# Patient Record
Sex: FEMALE | Race: BLACK OR AFRICAN AMERICAN | NOT HISPANIC OR LATINO
[De-identification: names, ages, dates, MRNs, and addresses within clinical notes are randomized per-mention and may not be internally consistent; named-entity substitution may affect disease eponyms.]

---

## 2017-02-15 ENCOUNTER — APPOINTMENT (OUTPATIENT)
Dept: INTERNAL MEDICINE | Facility: CLINIC | Age: 47
End: 2017-02-15

## 2017-02-16 ENCOUNTER — APPOINTMENT (OUTPATIENT)
Dept: INTERNAL MEDICINE | Facility: CLINIC | Age: 47
End: 2017-02-16

## 2017-06-27 ENCOUNTER — EMERGENCY (EMERGENCY)
Facility: HOSPITAL | Age: 47
LOS: 0 days | Discharge: HOME | End: 2017-06-27
Attending: EMERGENCY MEDICINE
Payer: COMMERCIAL

## 2017-06-27 VITALS
DIASTOLIC BLOOD PRESSURE: 71 MMHG | SYSTOLIC BLOOD PRESSURE: 118 MMHG | WEIGHT: 169.98 LBS | OXYGEN SATURATION: 99 % | HEART RATE: 82 BPM | TEMPERATURE: 99 F | HEIGHT: 70 IN | RESPIRATION RATE: 18 BRPM

## 2017-06-27 VITALS
DIASTOLIC BLOOD PRESSURE: 77 MMHG | SYSTOLIC BLOOD PRESSURE: 122 MMHG | HEART RATE: 68 BPM | OXYGEN SATURATION: 98 % | TEMPERATURE: 98 F

## 2017-06-27 LAB
ALBUMIN SERPL ELPH-MCNC: 4 G/DL — SIGNIFICANT CHANGE UP (ref 3.3–5)
ALP SERPL-CCNC: 103 U/L — SIGNIFICANT CHANGE UP (ref 40–120)
ALT FLD-CCNC: 30 U/L — SIGNIFICANT CHANGE UP (ref 12–78)
ANION GAP SERPL CALC-SCNC: 5 MMOL/L — SIGNIFICANT CHANGE UP (ref 5–17)
APTT BLD: 35.4 SEC — SIGNIFICANT CHANGE UP (ref 27.5–37.4)
AST SERPL-CCNC: 19 U/L — SIGNIFICANT CHANGE UP (ref 15–37)
BASOPHILS # BLD AUTO: 0 K/UL — SIGNIFICANT CHANGE UP (ref 0–0.2)
BASOPHILS NFR BLD AUTO: 1.1 % — SIGNIFICANT CHANGE UP (ref 0–2)
BILIRUB SERPL-MCNC: 0.6 MG/DL — SIGNIFICANT CHANGE UP (ref 0.2–1.2)
BUN SERPL-MCNC: 21 MG/DL — SIGNIFICANT CHANGE UP (ref 7–23)
CALCIUM SERPL-MCNC: 8.8 MG/DL — SIGNIFICANT CHANGE UP (ref 8.5–10.1)
CHLORIDE SERPL-SCNC: 108 MMOL/L — SIGNIFICANT CHANGE UP (ref 96–108)
CO2 SERPL-SCNC: 30 MMOL/L — SIGNIFICANT CHANGE UP (ref 22–31)
CREAT SERPL-MCNC: 0.81 MG/DL — SIGNIFICANT CHANGE UP (ref 0.5–1.3)
EOSINOPHIL # BLD AUTO: 0.1 K/UL — SIGNIFICANT CHANGE UP (ref 0–0.5)
EOSINOPHIL NFR BLD AUTO: 2.9 % — SIGNIFICANT CHANGE UP (ref 0–6)
GLUCOSE SERPL-MCNC: 143 MG/DL — HIGH (ref 70–99)
HCG SERPL-ACNC: 6 MIU/ML — SIGNIFICANT CHANGE UP
HCT VFR BLD CALC: 36.9 % — SIGNIFICANT CHANGE UP (ref 34.5–45)
HGB BLD-MCNC: 13.2 G/DL — SIGNIFICANT CHANGE UP (ref 11.5–15.5)
INR BLD: 1.1 RATIO — SIGNIFICANT CHANGE UP (ref 0.88–1.16)
LYMPHOCYTES # BLD AUTO: 1.8 K/UL — SIGNIFICANT CHANGE UP (ref 1–3.3)
LYMPHOCYTES # BLD AUTO: 41.8 % — SIGNIFICANT CHANGE UP (ref 13–44)
MCHC RBC-ENTMCNC: 30.4 PG — SIGNIFICANT CHANGE UP (ref 27–34)
MCHC RBC-ENTMCNC: 35.8 GM/DL — SIGNIFICANT CHANGE UP (ref 32–36)
MCV RBC AUTO: 85 FL — SIGNIFICANT CHANGE UP (ref 80–100)
MONOCYTES # BLD AUTO: 0.2 K/UL — SIGNIFICANT CHANGE UP (ref 0–0.9)
MONOCYTES NFR BLD AUTO: 5.1 % — SIGNIFICANT CHANGE UP (ref 2–14)
NEUTROPHILS # BLD AUTO: 2.1 K/UL — SIGNIFICANT CHANGE UP (ref 1.8–7.4)
NEUTROPHILS NFR BLD AUTO: 49.1 % — SIGNIFICANT CHANGE UP (ref 43–77)
PLATELET # BLD AUTO: 205 K/UL — SIGNIFICANT CHANGE UP (ref 150–400)
POTASSIUM SERPL-MCNC: 4.2 MMOL/L — SIGNIFICANT CHANGE UP (ref 3.5–5.3)
POTASSIUM SERPL-SCNC: 4.2 MMOL/L — SIGNIFICANT CHANGE UP (ref 3.5–5.3)
PROT SERPL-MCNC: 7.4 GM/DL — SIGNIFICANT CHANGE UP (ref 6–8.3)
PROTHROM AB SERPL-ACNC: 12 SEC — SIGNIFICANT CHANGE UP (ref 9.8–12.7)
RBC # BLD: 4.34 M/UL — SIGNIFICANT CHANGE UP (ref 3.8–5.2)
RBC # FLD: 11.9 % — SIGNIFICANT CHANGE UP (ref 11–15)
SODIUM SERPL-SCNC: 143 MMOL/L — SIGNIFICANT CHANGE UP (ref 135–145)
TROPONIN I SERPL-MCNC: <.015 NG/ML — SIGNIFICANT CHANGE UP (ref 0.01–0.04)
WBC # BLD: 4.3 K/UL — SIGNIFICANT CHANGE UP (ref 3.8–10.5)
WBC # FLD AUTO: 4.3 K/UL — SIGNIFICANT CHANGE UP (ref 3.8–10.5)

## 2017-06-27 PROCEDURE — 70450 CT HEAD/BRAIN W/O DYE: CPT | Mod: 26

## 2017-06-27 PROCEDURE — 99285 EMERGENCY DEPT VISIT HI MDM: CPT

## 2017-06-27 RX ORDER — SODIUM CHLORIDE 9 MG/ML
1000 INJECTION INTRAMUSCULAR; INTRAVENOUS; SUBCUTANEOUS ONCE
Qty: 0 | Refills: 0 | Status: COMPLETED | OUTPATIENT
Start: 2017-06-27 | End: 2017-06-27

## 2017-06-27 RX ORDER — IBUPROFEN 200 MG
600 TABLET ORAL ONCE
Qty: 0 | Refills: 0 | Status: COMPLETED | OUTPATIENT
Start: 2017-06-27 | End: 2017-06-27

## 2017-06-27 RX ADMIN — Medication 600 MILLIGRAM(S): at 21:26

## 2017-06-27 RX ADMIN — SODIUM CHLORIDE 1000 MILLILITER(S): 9 INJECTION INTRAMUSCULAR; INTRAVENOUS; SUBCUTANEOUS at 21:26

## 2017-06-27 NOTE — ED ADULT NURSE NOTE - OBJECTIVE STATEMENT
Patient co right side tingling from 0900 hours this morning and intermitten head discomfort starting at 1700 hours this evening; points to the occipital region. States, "I feel lightheaded when I walk and when I wake up in the morning both my arms feel numb, more tingling on the right" Denies N/V, difficulty btreathing, chest pain, dizziness, SOB, blurred vision, double vision

## 2017-06-27 NOTE — ED PROVIDER NOTE - MEDICAL DECISION MAKING DETAILS
Patient with intermittent numbness of extremities and mild headache.  VSS.  Labs, EKG, CT head reviewed.  NIHSS 0.  Symptoms improved in ER with hydration and NSAIDs.  Differentials discussed were neuropathy, MS, possible TIA (low suspicion), nerve impingement, dehydration.  Patient is well appearing and outpatient MRI was discussed.  Neurology referral given.  Discussed results and outcome of today's visit with the patient.  Patient advised to please follow up with their primary care doctor within the next 24 hours and return to the Emergency Department for worsening symptoms or any other concerns.  Patient advised that their doctor may call  to follow up on the specific results of the tests performed today in the emergency department.   Patient appears well on discharge.

## 2017-06-27 NOTE — ED ADULT TRIAGE NOTE - CHIEF COMPLAINT QUOTE
"my head feels like pounding" Reports having headache and left sided tingling sensation starting this morning, worsening throughout day, with right hand as well. Denies nausea, vomiting, fever, chills, coughing, chest pain, shortness of breath, trouble walking, speaking.

## 2017-06-27 NOTE — ED PROVIDER NOTE - OBJECTIVE STATEMENT
Pertinent PMH/PSH/FHx/SHx and Review of Systems contained within:  Patient is a well appearing female denying any pertinent pmh who presents to the ED for headache and numbness.  Describes intermittent numbness and tingling of right arm for several months now, came to ER today because was noting numbness and tingling in the left arm and left leg.  Also noted gradual onset of dull left posterior headache 3/10, denies any neck pain or nuchal rigidity.  +Family history of DM, denies history of CVA, MS, or other neurologic disease.  Denies any falls, injuries, or limb pain.  Patient denies EtOH/tobacco/illicit substance use.    No fever/chills, No photophobia/eye pain/changes in vision, No ear pain/sore throat/dysphagia, No chest pain/palpitations, no SOB/cough/wheeze/stridor, No abdominal pain, No N/V/D, no dysuria/frequency/discharge, No neck/back pain, no rash.

## 2017-06-28 DIAGNOSIS — R51 HEADACHE: ICD-10-CM

## 2017-06-28 DIAGNOSIS — R20.0 ANESTHESIA OF SKIN: ICD-10-CM

## 2017-10-23 ENCOUNTER — APPOINTMENT (OUTPATIENT)
Dept: OBGYN | Facility: CLINIC | Age: 47
End: 2017-10-23
Payer: COMMERCIAL

## 2017-10-23 VITALS
HEART RATE: 72 BPM | WEIGHT: 174 LBS | HEIGHT: 66 IN | BODY MASS INDEX: 27.97 KG/M2 | DIASTOLIC BLOOD PRESSURE: 79 MMHG | SYSTOLIC BLOOD PRESSURE: 130 MMHG

## 2017-10-23 PROCEDURE — 99396 PREV VISIT EST AGE 40-64: CPT

## 2017-10-23 PROCEDURE — 99213 OFFICE O/P EST LOW 20 MIN: CPT | Mod: 25

## 2019-11-25 ENCOUNTER — APPOINTMENT (OUTPATIENT)
Dept: OBGYN | Facility: CLINIC | Age: 49
End: 2019-11-25

## 2019-12-18 ENCOUNTER — APPOINTMENT (OUTPATIENT)
Dept: OBGYN | Facility: CLINIC | Age: 49
End: 2019-12-18
Payer: COMMERCIAL

## 2019-12-18 VITALS
HEART RATE: 78 BPM | DIASTOLIC BLOOD PRESSURE: 81 MMHG | HEIGHT: 66 IN | BODY MASS INDEX: 25.23 KG/M2 | SYSTOLIC BLOOD PRESSURE: 118 MMHG | WEIGHT: 157 LBS

## 2019-12-18 DIAGNOSIS — N95.2 POSTMENOPAUSAL ATROPHIC VAGINITIS: ICD-10-CM

## 2019-12-18 DIAGNOSIS — Z01.419 ENCOUNTER FOR GYNECOLOGICAL EXAMINATION (GENERAL) (ROUTINE) W/OUT ABNORMAL FINDINGS: ICD-10-CM

## 2019-12-18 DIAGNOSIS — E28.319 ASYMPTOMATIC PREMATURE MENOPAUSE: ICD-10-CM

## 2019-12-18 PROCEDURE — 99396 PREV VISIT EST AGE 40-64: CPT

## 2019-12-22 LAB — HPV HIGH+LOW RISK DNA PNL CVX: NOT DETECTED

## 2019-12-23 LAB — CYTOLOGY CVX/VAG DOC THIN PREP: ABNORMAL

## 2020-12-18 ENCOUNTER — EMERGENCY (EMERGENCY)
Facility: HOSPITAL | Age: 50
LOS: 1 days | Discharge: HOME | End: 2020-12-18
Attending: EMERGENCY MEDICINE | Admitting: EMERGENCY MEDICINE
Payer: COMMERCIAL

## 2020-12-18 VITALS
TEMPERATURE: 98 F | OXYGEN SATURATION: 100 % | RESPIRATION RATE: 16 BRPM | HEART RATE: 100 BPM | SYSTOLIC BLOOD PRESSURE: 153 MMHG | DIASTOLIC BLOOD PRESSURE: 85 MMHG

## 2020-12-18 VITALS
HEART RATE: 84 BPM | DIASTOLIC BLOOD PRESSURE: 77 MMHG | OXYGEN SATURATION: 99 % | TEMPERATURE: 99 F | SYSTOLIC BLOOD PRESSURE: 150 MMHG | RESPIRATION RATE: 18 BRPM

## 2020-12-18 PROCEDURE — 99282 EMERGENCY DEPT VISIT SF MDM: CPT

## 2020-12-18 NOTE — ED PROVIDER NOTE - CHPI ED SYMPTOMS NEG
No slurred speech, no n/v/d, no abdominal pain, no chest pain, no unsteady gait, no fecal/urinary incontinence/no fever

## 2020-12-18 NOTE — ED PROVIDER NOTE - OBJECTIVE STATEMENT
50 yr old  F presents to the ED with c/o LLE posterior pain x multiple months with intermittent episodes of tingling and weakness in bilateral upper extremities x multiple weeks. Pt also notes right TMJ pain that worsens with chewing but improves with rest. Pt states she became concerned about a possible stroke due to consolidation of symptoms. Pt attempted to contact PMD for an appointment but was told the next available was in April, was able to exchange for an earlier appointment in January. Pt presents to ED today due to concern over jaw pain. No slurred speech, no n/v/d, no fever, no abdominal pain, no chest pain, no unsteady gait, no fecal/urinary incontinence. Pt works as an  from home, sitting for prolonged periods of time. No family hx of ACS or MS. Pt overall well-appearing.

## 2020-12-18 NOTE — ED PROVIDER NOTE - CLINICAL SUMMARY MEDICAL DECISION MAKING FREE TEXT BOX
50 yr old F with a multitude of complaints due to poor f/u. No acute signs of abnormalities, good neuro exam. No concerning symptoms on physical exam. Will arrange for pt to see spine and ENT, as well as provide info for other PMDs in the area.

## 2020-12-18 NOTE — ED ADULT TRIAGE NOTE - CHIEF COMPLAINT QUOTE
Pt c/o right facial pain, LLE pain x several months, right wrist pain and tingling, LUE pain and tingling x1 month

## 2021-06-07 PROBLEM — Z78.9 OTHER SPECIFIED HEALTH STATUS: Chronic | Status: ACTIVE | Noted: 2020-12-18

## 2021-06-30 ENCOUNTER — APPOINTMENT (OUTPATIENT)
Dept: OTOLARYNGOLOGY | Facility: CLINIC | Age: 51
End: 2021-06-30

## 2021-11-01 ENCOUNTER — EMERGENCY (EMERGENCY)
Facility: HOSPITAL | Age: 51
LOS: 1 days | Discharge: HOME | End: 2021-11-01
Attending: EMERGENCY MEDICINE | Admitting: EMERGENCY MEDICINE
Payer: COMMERCIAL

## 2021-11-01 VITALS
DIASTOLIC BLOOD PRESSURE: 68 MMHG | OXYGEN SATURATION: 100 % | HEART RATE: 60 BPM | RESPIRATION RATE: 16 BRPM | SYSTOLIC BLOOD PRESSURE: 119 MMHG

## 2021-11-01 VITALS
OXYGEN SATURATION: 100 % | HEART RATE: 99 BPM | HEIGHT: 70 IN | DIASTOLIC BLOOD PRESSURE: 100 MMHG | TEMPERATURE: 98 F | RESPIRATION RATE: 18 BRPM | SYSTOLIC BLOOD PRESSURE: 159 MMHG

## 2021-11-01 LAB
ALBUMIN SERPL ELPH-MCNC: 4.3 G/DL — SIGNIFICANT CHANGE UP (ref 3.3–5)
ALP SERPL-CCNC: 92 U/L — SIGNIFICANT CHANGE UP (ref 40–120)
ALT FLD-CCNC: 20 U/L — SIGNIFICANT CHANGE UP (ref 4–33)
ANION GAP SERPL CALC-SCNC: 9 MMOL/L — SIGNIFICANT CHANGE UP (ref 7–14)
APPEARANCE UR: CLEAR — SIGNIFICANT CHANGE UP
AST SERPL-CCNC: 21 U/L — SIGNIFICANT CHANGE UP (ref 4–32)
BASOPHILS # BLD AUTO: 0 K/UL — SIGNIFICANT CHANGE UP (ref 0–0.2)
BASOPHILS NFR BLD AUTO: 0 % — SIGNIFICANT CHANGE UP (ref 0–2)
BILIRUB SERPL-MCNC: 0.5 MG/DL — SIGNIFICANT CHANGE UP (ref 0.2–1.2)
BILIRUB UR-MCNC: NEGATIVE — SIGNIFICANT CHANGE UP
BUN SERPL-MCNC: 18 MG/DL — SIGNIFICANT CHANGE UP (ref 7–23)
CALCIUM SERPL-MCNC: 10 MG/DL — SIGNIFICANT CHANGE UP (ref 8.4–10.5)
CHLORIDE SERPL-SCNC: 103 MMOL/L — SIGNIFICANT CHANGE UP (ref 98–107)
CO2 SERPL-SCNC: 28 MMOL/L — SIGNIFICANT CHANGE UP (ref 22–31)
COLOR SPEC: SIGNIFICANT CHANGE UP
CREAT SERPL-MCNC: 0.78 MG/DL — SIGNIFICANT CHANGE UP (ref 0.5–1.3)
DIFF PNL FLD: NEGATIVE — SIGNIFICANT CHANGE UP
EOSINOPHIL # BLD AUTO: 0.05 K/UL — SIGNIFICANT CHANGE UP (ref 0–0.5)
EOSINOPHIL NFR BLD AUTO: 1.8 % — SIGNIFICANT CHANGE UP (ref 0–6)
GLUCOSE SERPL-MCNC: 100 MG/DL — HIGH (ref 70–99)
GLUCOSE UR QL: NEGATIVE — SIGNIFICANT CHANGE UP
HCT VFR BLD CALC: 37.9 % — SIGNIFICANT CHANGE UP (ref 34.5–45)
HGB BLD-MCNC: 12.5 G/DL — SIGNIFICANT CHANGE UP (ref 11.5–15.5)
IANC: 1.49 K/UL — LOW (ref 1.5–8.5)
KETONES UR-MCNC: NEGATIVE — SIGNIFICANT CHANGE UP
LEUKOCYTE ESTERASE UR-ACNC: NEGATIVE — SIGNIFICANT CHANGE UP
LYMPHOCYTES # BLD AUTO: 0.94 K/UL — LOW (ref 1–3.3)
LYMPHOCYTES # BLD AUTO: 31.5 % — SIGNIFICANT CHANGE UP (ref 13–44)
MCHC RBC-ENTMCNC: 29.1 PG — SIGNIFICANT CHANGE UP (ref 27–34)
MCHC RBC-ENTMCNC: 33 GM/DL — SIGNIFICANT CHANGE UP (ref 32–36)
MCV RBC AUTO: 88.3 FL — SIGNIFICANT CHANGE UP (ref 80–100)
MONOCYTES # BLD AUTO: 0.11 K/UL — SIGNIFICANT CHANGE UP (ref 0–0.9)
MONOCYTES NFR BLD AUTO: 3.6 % — SIGNIFICANT CHANGE UP (ref 2–14)
NEUTROPHILS # BLD AUTO: 1.53 K/UL — LOW (ref 1.8–7.4)
NEUTROPHILS NFR BLD AUTO: 51.4 % — SIGNIFICANT CHANGE UP (ref 43–77)
NITRITE UR-MCNC: NEGATIVE — SIGNIFICANT CHANGE UP
PH UR: 6.5 — SIGNIFICANT CHANGE UP (ref 5–8)
PLATELET # BLD AUTO: 229 K/UL — SIGNIFICANT CHANGE UP (ref 150–400)
POTASSIUM SERPL-MCNC: 3.9 MMOL/L — SIGNIFICANT CHANGE UP (ref 3.5–5.3)
POTASSIUM SERPL-SCNC: 3.9 MMOL/L — SIGNIFICANT CHANGE UP (ref 3.5–5.3)
PROT SERPL-MCNC: 6.8 G/DL — SIGNIFICANT CHANGE UP (ref 6–8.3)
PROT UR-MCNC: NEGATIVE — SIGNIFICANT CHANGE UP
RBC # BLD: 4.29 M/UL — SIGNIFICANT CHANGE UP (ref 3.8–5.2)
RBC # FLD: 12.1 % — SIGNIFICANT CHANGE UP (ref 10.3–14.5)
SARS-COV-2 RNA SPEC QL NAA+PROBE: SIGNIFICANT CHANGE UP
SODIUM SERPL-SCNC: 140 MMOL/L — SIGNIFICANT CHANGE UP (ref 135–145)
SP GR SPEC: 1.02 — SIGNIFICANT CHANGE UP (ref 1–1.05)
TROPONIN T, HIGH SENSITIVITY RESULT: <6 NG/L — SIGNIFICANT CHANGE UP
UROBILINOGEN FLD QL: SIGNIFICANT CHANGE UP
WBC # BLD: 2.98 K/UL — LOW (ref 3.8–10.5)
WBC # FLD AUTO: 2.98 K/UL — LOW (ref 3.8–10.5)

## 2021-11-01 PROCEDURE — 99285 EMERGENCY DEPT VISIT HI MDM: CPT | Mod: 25

## 2021-11-01 PROCEDURE — 93010 ELECTROCARDIOGRAM REPORT: CPT | Mod: NC

## 2021-11-01 PROCEDURE — 71046 X-RAY EXAM CHEST 2 VIEWS: CPT | Mod: 26

## 2021-11-01 RX ORDER — ACETAMINOPHEN 500 MG
650 TABLET ORAL ONCE
Refills: 0 | Status: COMPLETED | OUTPATIENT
Start: 2021-11-01 | End: 2021-11-01

## 2021-11-01 RX ADMIN — Medication 650 MILLIGRAM(S): at 10:09

## 2021-11-01 RX ADMIN — Medication 650 MILLIGRAM(S): at 12:34

## 2021-11-01 NOTE — ED PROVIDER NOTE - NSFOLLOWUPINSTRUCTIONS_ED_ALL_ED_FT
Spouse/Patient Follow up with own doctor in 1-2 days  Return to ED for any worsening of symptoms.   Have your blood pressure recheck in one week with own doctor

## 2021-11-01 NOTE — ED PROVIDER NOTE - PROGRESS NOTE DETAILS
pt informed of results. Has no complaints. no cp or sob. no n/v/d. Recommend close pmd followup. Dc w copies of results and referral for med clinic . Pt aware needs BP rechecked in one week.   wet read cxr wnl.

## 2021-11-01 NOTE — ED ADULT NURSE NOTE - OBJECTIVE STATEMENT
Pt A/Ox4 states she has had LT sided ringing in the ear since September, states she has not gone to see her PMD, but is hoping to soon. States the pain was worse today as she felt abdominal pain associated with her body pain and ringing in her LT ear. Pt appears well otherwise, breathing even and unlabored, skin warm and dry. PIV inserted with aseptic technique, blood drawn, labeled at bedside with 2 pt identifiers and sent to lab. Pt aware of POC, NAD. Medicated per order.

## 2021-11-01 NOTE — ED PROVIDER NOTE - PATIENT PORTAL LINK FT
You can access the FollowMyHealth Patient Portal offered by [facility] by registering at the following website: [email or web address]. By joining [facility]’s FollowMyHealth portal, you will also be able to view your health information using other applications (apps) compatible with our system.

## 2021-11-01 NOTE — ED PROVIDER NOTE - NSFOLLOWUPCLINICS_GEN_ALL_ED_FT
[facility] General Internal Medicine  General Internal Medicine  [address] [place] [address]  Phone: [phone]  Fax:

## 2021-11-01 NOTE — ED PROVIDER NOTE - PHYSICAL EXAMINATION
Dr Mckeon  nontoxic ambulatory female. mmm. non icteric.   no facial asymmetry no slurred speech supple neck  normal S1-S2  No resp distress. able to speak in full and clear sentences. no wheeze, rales or stridor.  soft nontender abdomen. no  rebound. no guarding. no sign of trauma. no CVAT  AOx3, no focal deficits. CN 2-12 grossly intact. nl gait. no meningeal signs.   no pedal edema. no calf tenderness. normal pulses bilateral feet.

## 2021-11-01 NOTE — ED PROVIDER NOTE - OBJECTIVE STATEMENT
51yoF no sig pmhx pw myalgia since 9-2021. Intermittent pain of ext, head, abdomen pain and chest pain on/off since Sept. Described as a "cramping pain" no associated n/v/d no sob no cough. CP is not pleuritic or exertional. no YEE no orthopnea no leg swelling or calf pain. no travel. nl BM. no dysuria. LMP at age 42 no vag bleeding. no hormone tx. no weight loss. Denies any ETOH/drug use   hasn't seen PMD.   Vaccinated for covid. no active cp now.

## 2021-11-01 NOTE — ED ADULT TRIAGE NOTE - CHIEF COMPLAINT QUOTE
Pt states that in September she developed pain and ringing to ears, headache, and now pain to muscles all over her body, tingling to body and burning under skin. Pt states the symptoms are more pronounced on left side of her body. Pt denies PMH, vaccinated against covid.

## 2022-02-01 ENCOUNTER — APPOINTMENT (OUTPATIENT)
Dept: NEUROLOGY | Facility: CLINIC | Age: 52
End: 2022-02-01

## 2022-02-16 NOTE — ED ADULT NURSE NOTE - CHIEF COMPLAINT QUOTE
Patient called back.    He states they are in [place] and he has been trying to walk more and has been swimming laps in the pool. He states when he is more active during the day he notices he will have increased right foot pain. He states it is the \"inner in-step\" of his foot. He states the pain is a sharp pain that at times will \"zing\" up to his toes. He states it was present all last night and he didn't sleep at all. He states this happens 1-2 times per week.     He states at times he has tried taking an extra gabapentin but last night that did not help. He states he also has tried taking tramadol and that sometimes helps, then another time it doesn't seem to help at all.     He states they have a bed that he can raise his feet at night but again not all the time that will work.     Patient states he has been taking the Gabapentin 2 tablets twice daily and if they will be doing more that day he may take an extra tablet.     Patient denies swelling, skin discoloration. He states he feels pretty well other than the foot pain.       Thoughts?       "my head feels like pounding" Reports having headache and left sided tingling sensation starting this morning, worsening throughout day, with right hand as well. Denies nausea, vomiting, fever, chills, coughing, chest pain, shortness of breath, trouble walking, speaking.

## 2022-02-18 ENCOUNTER — EMERGENCY (EMERGENCY)
Facility: HOSPITAL | Age: 52
LOS: 1 days | Discharge: HOME | End: 2022-02-18
Attending: EMERGENCY MEDICINE | Admitting: EMERGENCY MEDICINE
Payer: COMMERCIAL

## 2022-02-18 VITALS
SYSTOLIC BLOOD PRESSURE: 133 MMHG | DIASTOLIC BLOOD PRESSURE: 86 MMHG | RESPIRATION RATE: 16 BRPM | HEIGHT: 70 IN | HEART RATE: 78 BPM | TEMPERATURE: 98 F | OXYGEN SATURATION: 100 %

## 2022-02-18 VITALS
SYSTOLIC BLOOD PRESSURE: 111 MMHG | TEMPERATURE: 97 F | HEART RATE: 64 BPM | RESPIRATION RATE: 16 BRPM | OXYGEN SATURATION: 100 % | DIASTOLIC BLOOD PRESSURE: 69 MMHG

## 2022-02-18 LAB
A1C WITH ESTIMATED AVERAGE GLUCOSE RESULT: 5.8 % — HIGH (ref 4–5.6)
ALBUMIN SERPL ELPH-MCNC: 4.7 G/DL — SIGNIFICANT CHANGE UP (ref 3.3–5)
ALP SERPL-CCNC: 115 U/L — SIGNIFICANT CHANGE UP (ref 40–120)
ALT FLD-CCNC: 23 U/L — SIGNIFICANT CHANGE UP (ref 4–33)
AMMONIA BLD-MCNC: 16 UMOL/L — SIGNIFICANT CHANGE UP (ref 11–55)
AMPHET UR-MCNC: NEGATIVE — SIGNIFICANT CHANGE UP
ANION GAP SERPL CALC-SCNC: 12 MMOL/L — SIGNIFICANT CHANGE UP (ref 7–14)
ANISOCYTOSIS BLD QL: SLIGHT — SIGNIFICANT CHANGE UP
APAP SERPL-MCNC: <10 UG/ML — LOW (ref 15–25)
APPEARANCE UR: CLEAR — SIGNIFICANT CHANGE UP
AST SERPL-CCNC: 26 U/L — SIGNIFICANT CHANGE UP (ref 4–32)
BACTERIA # UR AUTO: NEGATIVE — SIGNIFICANT CHANGE UP
BARBITURATES UR SCN-MCNC: NEGATIVE — SIGNIFICANT CHANGE UP
BASOPHILS # BLD AUTO: 0.02 K/UL — SIGNIFICANT CHANGE UP (ref 0–0.2)
BASOPHILS NFR BLD AUTO: 0.6 % — SIGNIFICANT CHANGE UP (ref 0–2)
BENZODIAZ UR-MCNC: NEGATIVE — SIGNIFICANT CHANGE UP
BILIRUB SERPL-MCNC: 0.6 MG/DL — SIGNIFICANT CHANGE UP (ref 0.2–1.2)
BILIRUB UR-MCNC: NEGATIVE — SIGNIFICANT CHANGE UP
BUN SERPL-MCNC: 13 MG/DL — SIGNIFICANT CHANGE UP (ref 7–23)
CALCIUM SERPL-MCNC: 9.7 MG/DL — SIGNIFICANT CHANGE UP (ref 8.4–10.5)
CHLORIDE SERPL-SCNC: 104 MMOL/L — SIGNIFICANT CHANGE UP (ref 98–107)
CO2 SERPL-SCNC: 24 MMOL/L — SIGNIFICANT CHANGE UP (ref 22–31)
COCAINE METAB.OTHER UR-MCNC: NEGATIVE — SIGNIFICANT CHANGE UP
COLOR SPEC: YELLOW — SIGNIFICANT CHANGE UP
CREAT SERPL-MCNC: 0.82 MG/DL — SIGNIFICANT CHANGE UP (ref 0.5–1.3)
CREATININE URINE RESULT, DAU: 251 MG/DL — SIGNIFICANT CHANGE UP
DIFF PNL FLD: NEGATIVE — SIGNIFICANT CHANGE UP
EOSINOPHIL # BLD AUTO: 0.05 K/UL — SIGNIFICANT CHANGE UP (ref 0–0.5)
EOSINOPHIL NFR BLD AUTO: 1.5 % — SIGNIFICANT CHANGE UP (ref 0–6)
EPI CELLS # UR: 1 /HPF — SIGNIFICANT CHANGE UP (ref 0–5)
ESTIMATED AVERAGE GLUCOSE: 120 — SIGNIFICANT CHANGE UP
ETHANOL SERPL-MCNC: <10 MG/DL — SIGNIFICANT CHANGE UP
GLUCOSE SERPL-MCNC: 100 MG/DL — HIGH (ref 70–99)
GLUCOSE UR QL: NEGATIVE — SIGNIFICANT CHANGE UP
HCT VFR BLD CALC: 42.8 % — SIGNIFICANT CHANGE UP (ref 34.5–45)
HGB BLD-MCNC: 13.9 G/DL — SIGNIFICANT CHANGE UP (ref 11.5–15.5)
HIV 1+2 AB+HIV1 P24 AG SERPL QL IA: SIGNIFICANT CHANGE UP
HYALINE CASTS # UR AUTO: 1 /LPF — SIGNIFICANT CHANGE UP (ref 0–7)
IANC: 1.62 K/UL — SIGNIFICANT CHANGE UP (ref 1.5–8.5)
IMM GRANULOCYTES NFR BLD AUTO: 0.3 % — SIGNIFICANT CHANGE UP (ref 0–1.5)
KETONES UR-MCNC: NEGATIVE — SIGNIFICANT CHANGE UP
LEUKOCYTE ESTERASE UR-ACNC: NEGATIVE — SIGNIFICANT CHANGE UP
LG PLATELETS BLD QL AUTO: SLIGHT — SIGNIFICANT CHANGE UP
LYMPHOCYTES # BLD AUTO: 1.48 K/UL — SIGNIFICANT CHANGE UP (ref 1–3.3)
LYMPHOCYTES # BLD AUTO: 43.9 % — SIGNIFICANT CHANGE UP (ref 13–44)
MANUAL SMEAR VERIFICATION: SIGNIFICANT CHANGE UP
MCHC RBC-ENTMCNC: 28.7 PG — SIGNIFICANT CHANGE UP (ref 27–34)
MCHC RBC-ENTMCNC: 32.5 GM/DL — SIGNIFICANT CHANGE UP (ref 32–36)
MCV RBC AUTO: 88.2 FL — SIGNIFICANT CHANGE UP (ref 80–100)
METHADONE UR-MCNC: NEGATIVE — SIGNIFICANT CHANGE UP
MONOCYTES # BLD AUTO: 0.19 K/UL — SIGNIFICANT CHANGE UP (ref 0–0.9)
MONOCYTES NFR BLD AUTO: 5.6 % — SIGNIFICANT CHANGE UP (ref 2–14)
NEUTROPHILS # BLD AUTO: 1.62 K/UL — LOW (ref 1.8–7.4)
NEUTROPHILS NFR BLD AUTO: 48.1 % — SIGNIFICANT CHANGE UP (ref 43–77)
NITRITE UR-MCNC: NEGATIVE — SIGNIFICANT CHANGE UP
NRBC # BLD: 0 /100 WBCS — SIGNIFICANT CHANGE UP
NRBC # FLD: 0 K/UL — SIGNIFICANT CHANGE UP
OPIATES UR-MCNC: NEGATIVE — SIGNIFICANT CHANGE UP
OVALOCYTES BLD QL SMEAR: SLIGHT — SIGNIFICANT CHANGE UP
OXYCODONE UR-MCNC: NEGATIVE — SIGNIFICANT CHANGE UP
PCP SPEC-MCNC: SIGNIFICANT CHANGE UP
PCP UR-MCNC: NEGATIVE — SIGNIFICANT CHANGE UP
PH UR: 8 — SIGNIFICANT CHANGE UP (ref 5–8)
PLAT MORPH BLD: SIGNIFICANT CHANGE UP
PLATELET # BLD AUTO: 235 K/UL — SIGNIFICANT CHANGE UP (ref 150–400)
PLATELET COUNT - ESTIMATE: NORMAL — SIGNIFICANT CHANGE UP
POTASSIUM SERPL-MCNC: 4 MMOL/L — SIGNIFICANT CHANGE UP (ref 3.5–5.3)
POTASSIUM SERPL-SCNC: 4 MMOL/L — SIGNIFICANT CHANGE UP (ref 3.5–5.3)
PROT SERPL-MCNC: 7.4 G/DL — SIGNIFICANT CHANGE UP (ref 6–8.3)
PROT UR-MCNC: ABNORMAL
RBC # BLD: 4.85 M/UL — SIGNIFICANT CHANGE UP (ref 3.8–5.2)
RBC # FLD: 12.1 % — SIGNIFICANT CHANGE UP (ref 10.3–14.5)
RBC BLD AUTO: NORMAL — SIGNIFICANT CHANGE UP
RBC CASTS # UR COMP ASSIST: 3 /HPF — SIGNIFICANT CHANGE UP (ref 0–4)
SALICYLATES SERPL-MCNC: <0.3 MG/DL — LOW (ref 15–30)
SODIUM SERPL-SCNC: 140 MMOL/L — SIGNIFICANT CHANGE UP (ref 135–145)
SP GR SPEC: 1.03 — SIGNIFICANT CHANGE UP (ref 1–1.05)
THC UR QL: NEGATIVE — SIGNIFICANT CHANGE UP
TOXICOLOGY SCREEN, DRUGS OF ABUSE, SERUM RESULT: SIGNIFICANT CHANGE UP
TSH SERPL-MCNC: 1.22 UIU/ML — SIGNIFICANT CHANGE UP (ref 0.27–4.2)
UROBILINOGEN FLD QL: SIGNIFICANT CHANGE UP
WBC # BLD: 3.37 K/UL — LOW (ref 3.8–10.5)
WBC # FLD AUTO: 3.37 K/UL — LOW (ref 3.8–10.5)
WBC UR QL: 1 /HPF — SIGNIFICANT CHANGE UP (ref 0–5)

## 2022-02-18 PROCEDURE — 70450 CT HEAD/BRAIN W/O DYE: CPT | Mod: 26,MA

## 2022-02-18 PROCEDURE — 99285 EMERGENCY DEPT VISIT HI MDM: CPT

## 2022-02-18 NOTE — ED ADULT TRIAGE NOTE - CHIEF COMPLAINT QUOTE
Pt has multiple complaints c/o headache for the past 3 weeks, sore throat, chest discomfort, back pain and pain to b/l legs for the past week. Pt breathing even and unlabored at present, afebrile.
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
## 2022-02-18 NOTE — ED PROVIDER NOTE - PROGRESS NOTE DETAILS
Rosa Meneses PGY1: Patient updated on plan. She is agreeable to head CT and labs. Patient informed that she will likely require outpatient follow-up. Overall well appearing. CT head shows no acute findings. Labs unremarkable. Patient to be d/c with PCP f/u. Will likely need psych f/u as well. CT head shows no acute findings. Labs unremarkable. Patient to be d/c with PCP f/u. Patient agreeable to plan.

## 2022-02-18 NOTE — ED PROVIDER NOTE - PATIENT PORTAL LINK FT
You can access the FollowMyHealth Patient Portal offered by [facility] by registering at the following website: [email or web address]. By joining [facility]’s FollowMyHealth portal, you will also be able to view your health information using other applications (apps) compatible with our system.

## 2022-02-18 NOTE — ED PROVIDER NOTE - PHYSICAL EXAMINATION
Well appearing, well nourished, awake, alert, oriented to person, place, time/situation and in no apparent distress.    Airway patent    Eyes without scleral injection. No jaundice.    Strong pulse. No M/R/G.     Respirations unlabored. Lungs clear.     Abdomen soft, non-tender, no guarding.    Spine appears normal, range of motion is not limited, no muscle or joint tenderness. SLRs neg.    Alert and oriented, no gross motor or sensory deficits.    Skin normal color for race, warm, dry and intact. No evidence of rash.    No SI/HI.
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
## 2022-02-18 NOTE — ED PROVIDER NOTE - CLINICAL SUMMARY MEDICAL DECISION MAKING FREE TEXT BOX
Sg: Pt.'s story concerning for psychiatric problem. Will do organic w/u (tox screen, RADHA, HIV, RPR, EBV (had reactive lymphs in past), CT brain. If neg., recommend f/u w/ PCP.

## 2022-02-18 NOTE — ED PROVIDER NOTE - NSFOLLOWUPINSTRUCTIONS_ED_ALL_ED_FT
You were evaluated in the Emergency Department for concern for injuries and pain.  You were evaluated and examined by a physician, and you had labs, CT scan of the head, urine tests.      Based on your evaluation:    There are no signs of emergency conditions requiring admission to the hospital on today's workup.  Based on the evaluation, a presumptive diagnosis was made, however, further evaluation may be required by your primary care physician or a specialist for a more definitive diagnosis.  Therefore, please follow-up as directed or return to the Emergency Department if your symptoms change or worsen.    We recommend that you:  1. See your primary care physician within the next 72 hours for follow up.  Bring a copy of your discharge paperwork (including any test results) to your doctor.  2. Take tylenol 500mg -1000mg and ibuprofen 400-600mg every 4-6 hours as needed for pain    *** Return immediately if you have worsening symptoms, [INSERT SIGNS/SYMPTOMS TO WATCH FOR], or any other new/concerning symptoms. ***

## 2022-02-18 NOTE — ED PROVIDER NOTE - ATTENDING CONTRIBUTION TO CARE
I performed a face-to-face evaluation of the patient and performed a history and physical examination. I agree with the history and physical examination. If this was a PA visit, I personally saw the patient with the PA and performed a substantive portion of the visit including all aspects of the medical decision making.    Pt.'s story concerning for psychiatric problem. Will do organic w/u (tox screen, RADHA, HIV, RPR, EBV (had reactive lymphs in past), CT brain. If neg., recommend f/u w/ PCP.

## 2022-02-19 LAB — T PALLIDUM AB TITR SER: NEGATIVE — SIGNIFICANT CHANGE UP

## 2022-02-20 LAB
EBV EA AB SER IA-ACNC: <5 U/ML — SIGNIFICANT CHANGE UP
EBV EA AB TITR SER IF: POSITIVE
EBV EA IGG SER-ACNC: NEGATIVE — SIGNIFICANT CHANGE UP
EBV NA IGG SER IA-ACNC: 64.3 U/ML — HIGH
EBV PATRN SPEC IB-IMP: SIGNIFICANT CHANGE UP
EBV VCA IGG AVIDITY SER QL IA: ABNORMAL
EBV VCA IGM SER IA-ACNC: 19.7 U/ML — HIGH
EBV VCA IGM SER IA-ACNC: 78.6 U/ML — HIGH
EBV VCA IGM TITR FLD: POSITIVE

## 2022-02-22 LAB
ANA PAT FLD IF-IMP: ABNORMAL
ANA TITR SER: ABNORMAL

## 2022-02-23 ENCOUNTER — INPATIENT (INPATIENT)
Facility: HOSPITAL | Age: 52
LOS: 2 days | Discharge: HOME | End: 2022-02-26
Attending: STUDENT IN AN ORGANIZED HEALTH CARE EDUCATION/TRAINING PROGRAM | Admitting: STUDENT IN AN ORGANIZED HEALTH CARE EDUCATION/TRAINING PROGRAM
Payer: COMMERCIAL

## 2022-02-23 VITALS
DIASTOLIC BLOOD PRESSURE: 71 MMHG | TEMPERATURE: 98 F | HEIGHT: 70 IN | RESPIRATION RATE: 12 BRPM | SYSTOLIC BLOOD PRESSURE: 118 MMHG | HEART RATE: 80 BPM | OXYGEN SATURATION: 100 %

## 2022-02-23 DIAGNOSIS — M79.10 MYALGIA, UNSPECIFIED SITE: ICD-10-CM

## 2022-02-23 DIAGNOSIS — R76.0 RAISED ANTIBODY TITER: ICD-10-CM

## 2022-02-23 DIAGNOSIS — Z87.898 PERSONAL HISTORY OF OTHER SPECIFIED CONDITIONS: ICD-10-CM

## 2022-02-23 DIAGNOSIS — Z29.9 ENCOUNTER FOR PROPHYLACTIC MEASURES, UNSPECIFIED: ICD-10-CM

## 2022-02-23 DIAGNOSIS — R51.9 HEADACHE, UNSPECIFIED: ICD-10-CM

## 2022-02-23 LAB
ALBUMIN SERPL ELPH-MCNC: 4.2 G/DL — SIGNIFICANT CHANGE UP (ref 3.3–5)
ALP SERPL-CCNC: 100 U/L — SIGNIFICANT CHANGE UP (ref 40–120)
ALT FLD-CCNC: 23 U/L — SIGNIFICANT CHANGE UP (ref 4–33)
ANION GAP SERPL CALC-SCNC: 11 MMOL/L — SIGNIFICANT CHANGE UP (ref 7–14)
APTT BLD: 33.7 SEC — SIGNIFICANT CHANGE UP (ref 27–36.3)
AST SERPL-CCNC: 22 U/L — SIGNIFICANT CHANGE UP (ref 4–32)
BASOPHILS # BLD AUTO: 0.03 K/UL — SIGNIFICANT CHANGE UP (ref 0–0.2)
BASOPHILS NFR BLD AUTO: 0.8 % — SIGNIFICANT CHANGE UP (ref 0–2)
BILIRUB SERPL-MCNC: 0.3 MG/DL — SIGNIFICANT CHANGE UP (ref 0.2–1.2)
BUN SERPL-MCNC: 19 MG/DL — SIGNIFICANT CHANGE UP (ref 7–23)
C3 SERPL-MCNC: 112 MG/DL — SIGNIFICANT CHANGE UP (ref 90–180)
C4 SERPL-MCNC: 26 MG/DL — SIGNIFICANT CHANGE UP (ref 10–40)
CALCIUM SERPL-MCNC: 9.3 MG/DL — SIGNIFICANT CHANGE UP (ref 8.4–10.5)
CHLORIDE SERPL-SCNC: 106 MMOL/L — SIGNIFICANT CHANGE UP (ref 98–107)
CO2 SERPL-SCNC: 24 MMOL/L — SIGNIFICANT CHANGE UP (ref 22–31)
CREAT SERPL-MCNC: 0.79 MG/DL — SIGNIFICANT CHANGE UP (ref 0.5–1.3)
CRP SERPL-MCNC: <4 MG/L — SIGNIFICANT CHANGE UP
EOSINOPHIL # BLD AUTO: 0.1 K/UL — SIGNIFICANT CHANGE UP (ref 0–0.5)
EOSINOPHIL NFR BLD AUTO: 2.7 % — SIGNIFICANT CHANGE UP (ref 0–6)
ERYTHROCYTE [SEDIMENTATION RATE] IN BLOOD: 2 MM/HR — LOW (ref 4–25)
GLUCOSE SERPL-MCNC: 89 MG/DL — SIGNIFICANT CHANGE UP (ref 70–99)
HCT VFR BLD CALC: 39 % — SIGNIFICANT CHANGE UP (ref 34.5–45)
HGB BLD-MCNC: 12.5 G/DL — SIGNIFICANT CHANGE UP (ref 11.5–15.5)
IANC: 1.53 K/UL — SIGNIFICANT CHANGE UP (ref 1.5–8.5)
IMM GRANULOCYTES NFR BLD AUTO: 0 % — SIGNIFICANT CHANGE UP (ref 0–1.5)
INR BLD: 1.15 RATIO — SIGNIFICANT CHANGE UP (ref 0.88–1.16)
LYMPHOCYTES # BLD AUTO: 1.75 K/UL — SIGNIFICANT CHANGE UP (ref 1–3.3)
LYMPHOCYTES # BLD AUTO: 47.4 % — HIGH (ref 13–44)
MCHC RBC-ENTMCNC: 28.5 PG — SIGNIFICANT CHANGE UP (ref 27–34)
MCHC RBC-ENTMCNC: 32.1 GM/DL — SIGNIFICANT CHANGE UP (ref 32–36)
MCV RBC AUTO: 88.8 FL — SIGNIFICANT CHANGE UP (ref 80–100)
MONOCYTES # BLD AUTO: 0.28 K/UL — SIGNIFICANT CHANGE UP (ref 0–0.9)
MONOCYTES NFR BLD AUTO: 7.6 % — SIGNIFICANT CHANGE UP (ref 2–14)
NEUTROPHILS # BLD AUTO: 1.53 K/UL — LOW (ref 1.8–7.4)
NEUTROPHILS NFR BLD AUTO: 41.5 % — LOW (ref 43–77)
NRBC # BLD: 0 /100 WBCS — SIGNIFICANT CHANGE UP
NRBC # FLD: 0 K/UL — SIGNIFICANT CHANGE UP
PLATELET # BLD AUTO: 222 K/UL — SIGNIFICANT CHANGE UP (ref 150–400)
POTASSIUM SERPL-MCNC: 4.2 MMOL/L — SIGNIFICANT CHANGE UP (ref 3.5–5.3)
POTASSIUM SERPL-SCNC: 4.2 MMOL/L — SIGNIFICANT CHANGE UP (ref 3.5–5.3)
PROT SERPL-MCNC: 6.5 G/DL — SIGNIFICANT CHANGE UP (ref 6–8.3)
PROTHROM AB SERPL-ACNC: 13.4 SEC — SIGNIFICANT CHANGE UP (ref 10.5–13.4)
RBC # BLD: 4.39 M/UL — SIGNIFICANT CHANGE UP (ref 3.8–5.2)
RBC # FLD: 12.2 % — SIGNIFICANT CHANGE UP (ref 10.3–14.5)
SODIUM SERPL-SCNC: 141 MMOL/L — SIGNIFICANT CHANGE UP (ref 135–145)
WBC # BLD: 3.69 K/UL — LOW (ref 3.8–10.5)
WBC # FLD AUTO: 3.69 K/UL — LOW (ref 3.8–10.5)

## 2022-02-23 PROCEDURE — 99285 EMERGENCY DEPT VISIT HI MDM: CPT

## 2022-02-23 RX ORDER — LIDOCAINE HCL 20 MG/ML
10 VIAL (ML) INJECTION ONCE
Refills: 0 | Status: COMPLETED | OUTPATIENT
Start: 2022-02-23 | End: 2022-02-23

## 2022-02-23 RX ORDER — ACYCLOVIR SODIUM 500 MG
750 VIAL (EA) INTRAVENOUS ONCE
Refills: 0 | Status: DISCONTINUED | OUTPATIENT
Start: 2022-02-23 | End: 2022-02-23

## 2022-02-23 RX ORDER — ACYCLOVIR SODIUM 500 MG
690 VIAL (EA) INTRAVENOUS ONCE
Refills: 0 | Status: DISCONTINUED | OUTPATIENT
Start: 2022-02-23 | End: 2022-02-23

## 2022-02-23 RX ADMIN — Medication 10 MILLILITER(S): at 17:07
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
## 2022-02-23 NOTE — ED ADULT NURSE NOTE - CHIEF COMPLAINT QUOTE
pt called by md de anda to r/t ED for further testing after being treated and released last Friday for headache. pt c/o posterior head pain 5/10.

## 2022-02-23 NOTE — H&P ADULT - PROBLEM SELECTOR PLAN 4
DVT Prophylaxis; Low VTE risk assessment   Diet: Regular   Dispo: Pending medical course     Fall precautions   Full code
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
## 2022-02-23 NOTE — ED PROVIDER NOTE - ATTENDING CONTRIBUTION TO CARE
Attending note:   After face to face evaluation of this patient, I concur with above noted hx, pe, and care plan for this patient.  Cunningham: 51 yof with headache for few months. Pt also had multiple other complaints as noted above and previous ED visit also reviewed. No fever, no neck pain. Pt also noting intermittent blurry vision, none now. Pt is in no distress, PERRL, EOMI, no meningeal signs, clear lungs, RRR, abd soft and non tender, motor 5/5, sensation normal, gait normal.  Pt was called back for EBV tests positive. 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 Pt agreed, will perform LP after labs.

## 2022-02-23 NOTE — ED ADULT NURSE NOTE - OBJECTIVE STATEMENT
Received pt in room 15. pt A&OX3, ambulatory. pt with no PMH. pt called by MD to return to the ED for further testing after being treated and released Friday for headache pt was told has EBB virus. pt c/o headache and body pains x a few months. appears to be in no distress at this time. vitally stable. respirations are equal and nonlabored, no respiratory distress noted, sating 100% on room air. denies any chest pain, sob, cough, fever/chills, dizziness, blurry vision, n/v/d. 20 gauge iv placed in the left ac, labs sent. pt stable, safety maintained. awaiting further plan. will reassess and monitor.

## 2022-02-23 NOTE — H&P ADULT - NSHPPHYSICALEXAM_GEN_ALL_CORE
CONSTITUTIONAL: NAD; well-developed;   HEENT: PERRL, clear conjunctiva  RESPIRATORY: Normal respiratory effort; lungs are clear to auscultation bilaterally; No Crackles/Rhonchi/Wheezing  CARDIOVASCULAR: Regular rate and rhythm, normal S1 and S2, no murmur/rub/gallop; No lower extremity edema; Peripheral pulses are 2+ bilaterally  ABDOMEN: Nontender to Palpation; normoactive bowel sounds, no rebound/guarding; No hepatosplenomegaly  MUSCLOSKELETAL: no clubbing or cyanosis of digits; no joint swelling or tenderness to palpation  EXTREMITY: Lower extremities Non-tender to palpation; non-erythematous B/L  Neuro: A&Ox3; no focal deficits; Intact 5/5 upper & Lower strength; 5/5 sensation upper & Lower ext; Negative pronator drift; negative romberg test; intact gait  Psych: normal mood; Affect appropirate CONSTITUTIONAL: NAD; well-developed;   HEENT: PERRL, Submandibular lymphadenopathy; clear conjunctiva  RESPIRATORY: Normal respiratory effort; lungs are clear to auscultation bilaterally; No Crackles/Rhonchi/Wheezing  CARDIOVASCULAR: Regular rate and rhythm, normal S1 and S2, no murmur/rub/gallop; No lower extremity edema; Peripheral pulses are 2+ bilaterally  ABDOMEN: Nontender to Palpation; normoactive bowel sounds, no rebound/guarding; No hepatosplenomegaly  MUSCLOSKELETAL: no clubbing or cyanosis of digits; no joint swelling or tenderness to palpation  EXTREMITY: Lower extremities Non-tender to palpation; non-erythematous B/L  Neuro: A&Ox3; no focal deficits; Intact 5/5 upper & Lower strength; 5/5 sensation upper & Lower ext; Negative pronator drift; negative romberg test; intact gait  Psych: normal mood; Affect appropirate
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
## 2022-02-23 NOTE — H&P ADULT - ASSESSMENT
51F no significant PMH presenting with Occipital non-radiating headaches for the past 2 months w/ associated loss of balance and no focal neurological deficits. Found to be EBV +     51F no significant PMH presenting with Occipital non-radiating headaches for the past 2 months w/ associated loss of balance and no focal neurological deficits. Found to be EBV +, CTH WNL. Admitted to medicine for further monitoring.

## 2022-02-23 NOTE — H&P ADULT - PROBLEM SELECTOR PLAN 3
DVT Prophylaxis;   Diet: Regular   Dispo: Pending medical course     Fall precautions   Full code DVT Prophylaxis; Low VTE risk assessment   Diet: Regular   Dispo: Pending medical course     Fall precautions   Full code mild, afebrile. can check UA, trend cbc

## 2022-02-23 NOTE — H&P ADULT - NSHPLABSRESULTS_GEN_ALL_CORE
12.5   3.69  )-----------( 222      ( 23 Feb 2022 14:30 )             39.0       02-23    141  |  106  |  19  ----------------------------<  89  4.2   |  24  |  0.79    Ca    9.3      23 Feb 2022 14:30    TPro  6.5  /  Alb  4.2  /  TBili  0.3  /  DBili  x   /  AST  22  /  ALT  23  /  AlkPhos  100  02-23                  PT/INR - ( 23 Feb 2022 14:30 )   PT: 13.4 sec;   INR: 1.15 ratio         PTT - ( 23 Feb 2022 14:30 )  PTT:33.7 sec    Lactate Trend            CAPILLARY BLOOD GLUCOSE

## 2022-02-23 NOTE — ED PROVIDER NOTE - NS ED MD TWO NIGHTS YN
December 11, 2019     Patient: Troy Rodriguez   YOB: 2004   Date of Visit: 12/11/2019       To Whom it May Concern:    Troy Rodriguez was seen in my clinic on 12/11/2019 at 8:40 am. He has had a cough for 3 weeks and is being tested for pertussis. He will be out of school until the results are back.     Please excuse Troy for his absence from school on the date listed above to be able to make his appointment.    Sincerely,         Ana Ramirez, DO    Medical information is confidential and cannot be disclosed without the written consent of the patient or his representative.      
December 12, 2019     Patient: Troy Rodriguez   YOB: 2004   Date of Visit: 12/11/2019       To Whom it May Concern:    Troy Rodriguez was seen in my clinic on 12/11/2019 at 8:40 am.   Troy was checked for pertussis and the test was negative. He can return to school.    Please excuse Troy for his absence from school on the date listed above to be able to make his appointment.    Sincerely,         Ana Ramirez, DO    Medical information is confidential and cannot be disclosed without the written consent of the patient or his representative.      
Yes

## 2022-02-23 NOTE — ED POST DISCHARGE NOTE - REASON FOR FOLLOW-UP
Other RADHA : 1: 320, EBV : positive IgM. Dr Bettencourt s/w patient who admits to headache, unsteady gait and bizarre / delusionla thoughts. Patient agrees to return to ED for spinal tap to R/o encephalitis.

## 2022-02-23 NOTE — H&P ADULT - PROBLEM SELECTOR PLAN 1
Patient c/o headaches for 2 months with associated recent loss of balance   No recent traveling, no sick contacts   No signs of infection; negative nuchal rigidity; negative Patient c/o headaches for 2 months with associated recent loss of balance   No recent traveling, no sick contacts   No signs of infection; negative nuchal rigidity; negative brudzinsky   Electrolytes WNL; CTH WNL   EBV+, concern for EBV encephalitis vs less likely bacterial meningitis   - Attempted LP in ED   - Patient will need LP in AM   - Consult neuro Patient c/o headaches for 2 months with associated recent loss of balance   No recent traveling, no sick contacts; no focal neurological findigns on exam   No signs of infection; negative nuchal rigidity; negative brudzinsky   Electrolytes WNL; CTH WNL   EBV+, concern for EBV encephalitis vs GCA vs less likely bacterial meningitis   - Attempted LP in ED   - Patient will need LP in AM   - Consult neuro  - Patient does not like taking medications to control pain, says she is comfortable jewell Patient c/o headaches for 2 months with associated recent loss of balance   No recent traveling, no sick contacts; no focal neurological findigns on exam   No signs of infection; negative nuchal rigidity; negative brudzinsky   Electrolytes WNL; CTH WNL   EBV+, concern for EBV encephalitis vs GCA vs migraine vs less likely bacterial meningitis vs less likely GCA   - Attempted LP in ED   - Patient will need LP in AM   - Consult neuro  - Patient does not like taking medications to control pain, says she is comfortable jewell Patient c/o headaches for 2 months with associated recent loss of balance   No recent traveling, no sick contacts; no focal neurological findigns on exam   No signs of infection; negative nuchal rigidity; negative brudzinsky   Electrolytes WNL; CTH WNL   EBV+, concern for EBV encephalitis vs GCA vs migraine vs less likely bacterial meningitis vs less likely GCA   - Attempted LP in ED   - Patient will need LP in AM   - MRI w/wo IV   - Depending on LP and MRI results will consider neuro consult   - Consider psych as well depending on results Patient c/o headaches for 2 months with associated recent loss of balance   No recent traveling, no sick contacts; no focal neurological findigns on exam   No signs of infection; negative nuchal rigidity; negative brudzinsky   Electrolytes WNL; CTH WNL   EBV+, concern for EBV encephalitis vs migraine vs less likely bacterial meningitis vs less likely GCA   - Attempted LP in ED   - Patient will need LP in AM   - MRI w/wo IV   - Depending on LP and MRI results will consider neuro consult   - Consider psych as well depending on results Patient c/o headaches for 2 months with associated recent loss of balance   No recent traveling, no sick contacts; no focal neurological findigns on exam   No signs of infection; negative nuchal rigidity; negative brudzinsky   Electrolytes WNL; CTH WNL   EBV+, concern for EBV encephalitis vs migraine vs less likely bacterial meningitis vs less likely GCA   - There was an Unsuccessful LP attempt in ED   - Patient will need LP in AM   - MRI w/wo IV   - Depending on LP and MRI results will consider neuro consult   - Consider psych as well depending on results

## 2022-02-23 NOTE — ED PROVIDER NOTE - CONSTITUTIONAL, MLM
Telephone Encounter by Yin Toth RMA at 02/18/18 09:36 AM     Author:  Yin Toth RMA Service:  (none) Author Type:  Certified Medical Assistant     Filed:  02/18/18 09:38 AM Encounter Date:  2/15/2018 Status:  Signed     :  Yin Toth RMA ([facility] Medical Assistant)            Rx authorized per protocol sent to [facility].[MA1.1M]      Revision History        User Key Date/Time User Provider Type Action    > MA1.1 02/18/18 09:38 AM Yin Toth RMA Certified Medical Assistant Sign    M - Manual             Well appearing, awake, alert, oriented to person, place, time/situation and in no apparent distress. normal...

## 2022-02-23 NOTE — ED ADULT NURSE REASSESSMENT NOTE - NS ED NURSE REASSESS COMMENT FT1
Report given to ESS[facility]1 RN. Pt resting comfortably in stretcher, respirations are even and unlabored. Pt endorsing no complaints at this time. Pt transported

## 2022-02-23 NOTE — H&P ADULT - PROBLEM SELECTOR PLAN 2
Patient age > 50 complaining of B/L Shoulder and Knee pain for months, states the pain is worst when she wakes up and now with a new headache   + RADHA ; no muscle weakness.   Concern for a rheumatological process such as polymyalgia rheumatica vs arthtritic pain   - F/u acute phase reactants (ESR, CRP  - Rheum consult   - Consider association with GCA Patient age > 50 complaining of B/L Shoulder and Knee pain for months, states the pain is worst when she wakes up and now with a new headache   + RADHA ; no muscle weakness.   Concern for a rheumatological process in the setting of elevated RADHA   Althought less likely polymyalgia rheumatica since acute phase reactants low   - Rheum consult  - Pain control PRN Patient age > 50 complaining of B/L Shoulder and Knee pain for months, states the pain is worst when she wakes up and now with a new headache   + RADHA ; no muscle weakness.   Concern for a rheumatological process in the setting of elevated RADHA   Althought less likely polymyalgia rheumatica since acute phase reactants low   - F/u CCP, RF, DSDNA   - Consider rheum consult based on results   - Pain control PRN

## 2022-02-23 NOTE — H&P ADULT - NSHPREVIEWOFSYSTEMS_GEN_ALL_CORE
General: + Headache; Denies dizziness, fatigue  Eyes: Denies blurry vision  ENMT: Denies rhinorrhea  Respiratory: Denies cough, SOB  Cardiovascular: Denies palpitations, CP  Gastrointestinal: Denies abd pain, N/V/D/C, hematochezia, melena  : Denies dysuria, increased freq  Musculoskeletal: + Shoulder pain B/L; + knee pain B/L  Endocrine: Denies increased thirst, increased frequency  Allergic/Immunologic: Denies rashes or hives  Neuro: + Loss of balance; Denies weakness, numbness  Psych: Denies anxiety, depression  All ROS negative unless indicated above General: + Headache; Denies dizziness, fatigue  Eyes: Denies blurry vision  ENMT: Denies rhinorrhea  Respiratory: Denies cough, SOB  Cardiovascular: Denies palpitations, CP  Gastrointestinal: Denies abd pain, N/V/D/C, hematochezia, melena  : Denies dysuria, increased freq  Musculoskeletal: + Shoulder pain B/L; + knee pain B/L  Endocrine: Denies increased thirst, increased frequency  Allergic/Immunologic: Denies rashes or hives  Neuro: + Loss of balance; Denies weakness, numbness  Psych: Denies anxiety, depression
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
## 2022-02-24 ENCOUNTER — RESULT REVIEW (OUTPATIENT)
Age: 52
End: 2022-02-24

## 2022-02-24 DIAGNOSIS — R51.9 HEADACHE, UNSPECIFIED: ICD-10-CM

## 2022-02-24 DIAGNOSIS — D72.819 DECREASED WHITE BLOOD CELL COUNT, UNSPECIFIED: ICD-10-CM

## 2022-02-24 LAB
ALBUMIN SERPL ELPH-MCNC: 4.1 G/DL — SIGNIFICANT CHANGE UP (ref 3.3–5)
ALP SERPL-CCNC: 97 U/L — SIGNIFICANT CHANGE UP (ref 40–120)
ALT FLD-CCNC: 17 U/L — SIGNIFICANT CHANGE UP (ref 4–33)
AMPHET UR-MCNC: NEGATIVE — SIGNIFICANT CHANGE UP
ANION GAP SERPL CALC-SCNC: 10 MMOL/L — SIGNIFICANT CHANGE UP (ref 7–14)
APPEARANCE CSF: CLEAR — SIGNIFICANT CHANGE UP
APPEARANCE SPUN FLD: COLORLESS — SIGNIFICANT CHANGE UP
APPEARANCE UR: CLEAR — SIGNIFICANT CHANGE UP
AST SERPL-CCNC: 23 U/L — SIGNIFICANT CHANGE UP (ref 4–32)
BARBITURATES UR SCN-MCNC: NEGATIVE — SIGNIFICANT CHANGE UP
BASOPHILS # BLD AUTO: 0.03 K/UL — SIGNIFICANT CHANGE UP (ref 0–0.2)
BASOPHILS NFR BLD AUTO: 1 % — SIGNIFICANT CHANGE UP (ref 0–2)
BENZODIAZ UR-MCNC: NEGATIVE — SIGNIFICANT CHANGE UP
BILIRUB SERPL-MCNC: 0.6 MG/DL — SIGNIFICANT CHANGE UP (ref 0.2–1.2)
BILIRUB UR-MCNC: NEGATIVE — SIGNIFICANT CHANGE UP
BUN SERPL-MCNC: 16 MG/DL — SIGNIFICANT CHANGE UP (ref 7–23)
CALCIUM SERPL-MCNC: 9.3 MG/DL — SIGNIFICANT CHANGE UP (ref 8.4–10.5)
CCP IGG SERPL-ACNC: <8 UNITS — SIGNIFICANT CHANGE UP
CHLORIDE SERPL-SCNC: 107 MMOL/L — SIGNIFICANT CHANGE UP (ref 98–107)
CK SERPL-CCNC: 127 U/L — SIGNIFICANT CHANGE UP (ref 25–170)
CO2 SERPL-SCNC: 25 MMOL/L — SIGNIFICANT CHANGE UP (ref 22–31)
COCAINE METAB.OTHER UR-MCNC: NEGATIVE — SIGNIFICANT CHANGE UP
COLOR CSF: COLORLESS — SIGNIFICANT CHANGE UP
COLOR SPEC: SIGNIFICANT CHANGE UP
CREAT SERPL-MCNC: 0.82 MG/DL — SIGNIFICANT CHANGE UP (ref 0.5–1.3)
CREATININE URINE RESULT, DAU: 100 MG/DL — SIGNIFICANT CHANGE UP
CSF PCR RESULT: SIGNIFICANT CHANGE UP
DIFF PNL FLD: NEGATIVE — SIGNIFICANT CHANGE UP
EOSINOPHIL # BLD AUTO: 0.11 K/UL — SIGNIFICANT CHANGE UP (ref 0–0.5)
EOSINOPHIL NFR BLD AUTO: 3.6 % — SIGNIFICANT CHANGE UP (ref 0–6)
GLUCOSE CSF-MCNC: 62 MG/DL — SIGNIFICANT CHANGE UP (ref 40–70)
GLUCOSE SERPL-MCNC: 87 MG/DL — SIGNIFICANT CHANGE UP (ref 70–99)
GLUCOSE UR QL: NEGATIVE — SIGNIFICANT CHANGE UP
GRAM STN FLD: SIGNIFICANT CHANGE UP
HCT VFR BLD CALC: 38.1 % — SIGNIFICANT CHANGE UP (ref 34.5–45)
HGB BLD-MCNC: 12.5 G/DL — SIGNIFICANT CHANGE UP (ref 11.5–15.5)
IANC: 1.24 K/UL — LOW (ref 1.5–8.5)
IMM GRANULOCYTES NFR BLD AUTO: 0 % — SIGNIFICANT CHANGE UP (ref 0–1.5)
KETONES UR-MCNC: NEGATIVE — SIGNIFICANT CHANGE UP
LEUKOCYTE ESTERASE UR-ACNC: NEGATIVE — SIGNIFICANT CHANGE UP
LYMPHOCYTES # BLD AUTO: 1.45 K/UL — SIGNIFICANT CHANGE UP (ref 1–3.3)
LYMPHOCYTES # BLD AUTO: 47.1 % — HIGH (ref 13–44)
LYMPHOCYTES # CSF: 100 % — SIGNIFICANT CHANGE UP
MAGNESIUM SERPL-MCNC: 1.9 MG/DL — SIGNIFICANT CHANGE UP (ref 1.6–2.6)
MCHC RBC-ENTMCNC: 28.9 PG — SIGNIFICANT CHANGE UP (ref 27–34)
MCHC RBC-ENTMCNC: 32.8 GM/DL — SIGNIFICANT CHANGE UP (ref 32–36)
MCV RBC AUTO: 88.2 FL — SIGNIFICANT CHANGE UP (ref 80–100)
METHADONE UR-MCNC: NEGATIVE — SIGNIFICANT CHANGE UP
MONOCYTES # BLD AUTO: 0.25 K/UL — SIGNIFICANT CHANGE UP (ref 0–0.9)
MONOCYTES NFR BLD AUTO: 8.1 % — SIGNIFICANT CHANGE UP (ref 2–14)
NEUTROPHILS # BLD AUTO: 1.24 K/UL — LOW (ref 1.8–7.4)
NEUTROPHILS # CSF: 0 % — SIGNIFICANT CHANGE UP
NEUTROPHILS NFR BLD AUTO: 40.2 % — LOW (ref 43–77)
NITRITE UR-MCNC: NEGATIVE — SIGNIFICANT CHANGE UP
NRBC # BLD: 0 /100 WBCS — SIGNIFICANT CHANGE UP
NRBC # FLD: 0 K/UL — SIGNIFICANT CHANGE UP
NRBC NFR CSF: 1 CELLS/UL — SIGNIFICANT CHANGE UP (ref 0–5)
OPIATES UR-MCNC: NEGATIVE — SIGNIFICANT CHANGE UP
OXYCODONE UR-MCNC: NEGATIVE — SIGNIFICANT CHANGE UP
PCP SPEC-MCNC: SIGNIFICANT CHANGE UP
PCP UR-MCNC: NEGATIVE — SIGNIFICANT CHANGE UP
PH UR: 6 — SIGNIFICANT CHANGE UP (ref 5–8)
PHOSPHATE SERPL-MCNC: 3.8 MG/DL — SIGNIFICANT CHANGE UP (ref 2.5–4.5)
PLATELET # BLD AUTO: 208 K/UL — SIGNIFICANT CHANGE UP (ref 150–400)
POTASSIUM SERPL-MCNC: 4.3 MMOL/L — SIGNIFICANT CHANGE UP (ref 3.5–5.3)
POTASSIUM SERPL-SCNC: 4.3 MMOL/L — SIGNIFICANT CHANGE UP (ref 3.5–5.3)
PROT CSF-MCNC: 32 MG/DL — SIGNIFICANT CHANGE UP (ref 15–45)
PROT SERPL-MCNC: 6.3 G/DL — SIGNIFICANT CHANGE UP (ref 6–8.3)
PROT UR-MCNC: NEGATIVE — SIGNIFICANT CHANGE UP
RBC # BLD: 4.32 M/UL — SIGNIFICANT CHANGE UP (ref 3.8–5.2)
RBC # CSF: 66 CELLS/UL — HIGH (ref 0–0)
RBC # FLD: 12.1 % — SIGNIFICANT CHANGE UP (ref 10.3–14.5)
RF+CCP IGG SER-IMP: NEGATIVE — SIGNIFICANT CHANGE UP
RHEUMATOID FACT SERPL-ACNC: <10 IU/ML — SIGNIFICANT CHANGE UP (ref 0–13)
SARS-COV-2 RNA SPEC QL NAA+PROBE: SIGNIFICANT CHANGE UP
SODIUM SERPL-SCNC: 142 MMOL/L — SIGNIFICANT CHANGE UP (ref 135–145)
SP GR SPEC: 1.03 — SIGNIFICANT CHANGE UP (ref 1–1.05)
SPECIMEN SOURCE: SIGNIFICANT CHANGE UP
THC UR QL: NEGATIVE — SIGNIFICANT CHANGE UP
TOTAL CELLS COUNTED, SPINAL FLUID: 10 CELLS — SIGNIFICANT CHANGE UP
TUBE TYPE: SIGNIFICANT CHANGE UP
UROBILINOGEN FLD QL: SIGNIFICANT CHANGE UP
WBC # BLD: 3.08 K/UL — LOW (ref 3.8–10.5)
WBC # FLD AUTO: 3.08 K/UL — LOW (ref 3.8–10.5)

## 2022-02-24 PROCEDURE — 88108 CYTOPATH CONCENTRATE TECH: CPT | Mod: 26

## 2022-02-24 PROCEDURE — 62270 DX LMBR SPI PNXR: CPT | Mod: GC

## 2022-02-24 PROCEDURE — 95816 EEG AWAKE AND DROWSY: CPT | Mod: 26

## 2022-02-24 PROCEDURE — 99223 1ST HOSP IP/OBS HIGH 75: CPT | Mod: GC,25

## 2022-02-24 PROCEDURE — 73120 X-RAY EXAM OF HAND: CPT | Mod: 26,50

## 2022-02-24 PROCEDURE — 12345: CPT | Mod: NC

## 2022-02-24 PROCEDURE — 70553 MRI BRAIN STEM W/O & W/DYE: CPT | Mod: 26

## 2022-02-24 RX ORDER — LIDOCAINE HCL 20 MG/ML
10 VIAL (ML) INJECTION ONCE
Refills: 0 | Status: COMPLETED | OUTPATIENT
Start: 2022-02-24 | End: 2022-02-24

## 2022-02-24 RX ORDER — INFLUENZA VIRUS VACCINE 15; 15; 15; 15 UG/.5ML; UG/.5ML; UG/.5ML; UG/.5ML
0.5 SUSPENSION INTRAMUSCULAR ONCE
Refills: 0 | Status: DISCONTINUED | OUTPATIENT
Start: 2022-02-24 | End: 2022-02-26

## 2022-02-24 RX ORDER — ACETAMINOPHEN 500 MG
650 TABLET ORAL EVERY 6 HOURS
Refills: 0 | Status: DISCONTINUED | OUTPATIENT
Start: 2022-02-24 | End: 2022-02-26

## 2022-02-24 RX ADMIN — Medication 10 MILLILITER(S): at 17:05

## 2022-02-24 NOTE — PROCEDURE NOTE - NSINFORMCONSENT_GEN_A_CORE
Daughter/Benefits, risks, and possible complications of procedure explained to patient/caregiver who verbalized understanding and gave written consent.

## 2022-02-24 NOTE — PATIENT PROFILE ADULT - FALL HARM RISK - UNIVERSAL INTERVENTIONS
Bed in lowest position, wheels locked, appropriate side rails in place/Call bell, personal items and telephone in reach/Instruct patient to call for assistance before getting out of bed or chair/Non-slip footwear when patient is out of bed/Brent to call system/Physically safe environment - no spills, clutter or unnecessary equipment/Purposeful Proactive Rounding/Room/bathroom lighting operational, light cord in reach

## 2022-02-24 NOTE — PROGRESS NOTE ADULT - ASSESSMENT
51F no significant PMH presenting with Occipital non-radiating headaches for the past 2 months w/ associated loss of balance and no focal neurological deficits. Found to be EBV +, CTH WNL. Admitted to medicine for further monitoring.      51F no significant PMH presenting with Occipital non-radiating headaches for the past 2 months w/ associated loss of balance and no focal neurological deficits. Found to have prior EBV effection, CTH WNL. LP unsuccessful attempt in ED.  Admitted to medicine for further monitoring.

## 2022-02-24 NOTE — PROCEDURE NOTE - ADDITIONAL PROCEDURE DETAILS
Patient was positioned in the lateral recumbent positioning, prepped and draped in the usual sterile fashion. Providers donned sterile gloves, gown/mask/cap.   The L4/L5 interspace was located using the iliac crests as landmarks. 10 cc 1% lidocaine was used to anesthetize the area.  22G needled inserted; stylet removed with appropriate fluid return After adequate fluid was collected stylet was reinserted and needle was removed.  First attempt at L3/L4 interspace unsuccessful.

## 2022-02-24 NOTE — PROGRESS NOTE ADULT - PROBLEM SELECTOR PLAN 1
Patient c/o headaches for 2 months with associated recent loss of balance   No recent traveling, no sick contacts; no focal neurological findigns on exam   No signs of infection; negative nuchal rigidity; negative brudzinsky   Electrolytes WNL; CTH WNL   EBV+, concern for EBV encephalitis vs migraine vs less likely bacterial meningitis vs less likely GCA   - There was an Unsuccessful LP attempt in ED   - Patient will need LP in AM   - MRI w/wo IV   - Depending on LP and MRI results will consider neuro consult   - Consider psych as well depending on results Patient c/o headaches for 2 months with associated recent loss of balance   No recent traveling, no sick contacts; no focal neurological findigns on exam   No signs of infection; negative nuchal rigidity; negative brudzinsky   Electrolytes WNL; CTH WNL   Prior EBV+, concern for EBV encephalitis vs migraine vs less likely bacterial meningitis vs less likely GCA   - There was an Unsuccessful LP attempt in ED   - Plan for LP this afternoon with primary team. Patient made aware.   - MRI w/wo IV ordered.  - Depending on LP and MRI results will consider neuro consult   - Consider psych as well depending on results Patient c/o headaches for 2 months with associated recent loss of balance.   No recent traveling, no sick contacts; no focal neurological findigns on exam   No signs of infection; negative nuchal rigidity; negative brudzinsky   Electrolytes WNL; CTH WNL   Prior EBV+, concern for EBV encephalitis vs migraine vs less likely bacterial meningitis vs less likely GCA   - There was an Unsuccessful LP attempt in ED   - Plan for LP this afternoon with primary team. Patient made aware.   - MRI w/wo IV ordered.  - Paraneoplastic labs; history c/f hallucinations.   - Depending on LP and MRI results will consider neuro consult   - Consider psych as well depending on results

## 2022-02-24 NOTE — PROGRESS NOTE ADULT - SUBJECTIVE AND OBJECTIVE BOX
CLAY WHIPPLE  51y  Female      Patient is a 51y old  Female who presents with a chief complaint of Headaches (2022 20:07)      INTERVAL HPI/OVERNIGHT EVENTS:  Unsuccessful LP in ED last night.     T(C): 37.2 (22 @ 04:59), Max: 37.2 (22 @ 04:59)  HR: 69 (22 @ 04:59) (65 - 81)  BP: 109/89 (22 @ 04:59) (109/89 - 126/79)  RR: 18 (22 @ 04:59) (12 - 18)  SpO2: 98% (22 @ 04:59) (98% - 100%)  Wt(kg): --Vital Signs Last 24 Hrs  T(C): 37.2 (2022 04:59), Max: 37.2 (2022 04:59)  T(F): 98.9 (2022 04:59), Max: 98.9 (2022 04:59)  HR: 69 (2022 04:59) (65 - 81)  BP: 109/89 (2022 04:59) (109/89 - 126/79)  BP(mean): --  RR: 18 (2022 04:59) (12 - 18)  SpO2: 98% (2022 04:59) (98% - 100%)    PHYSICAL EXAM:  GENERAL: NAD, well-groomed, well-developed  HEAD:  Atraumatic, Normocephalic  EYES: EOMI, PERRLA, conjunctiva and sclera clear  ENMT: No tonsillar erythema, exudates, or enlargement; Moist mucous membranes, Good dentition, No lesions  NECK: Supple, No JVD, Normal thyroid  NERVOUS SYSTEM:  Alert & Oriented X3, Good concentration; Motor Strength 5/5 B/L upper and lower extremities; DTRs 2+ intact and symmetric  CHEST/LUNG: Clear to percussion bilaterally; No rales, rhonchi, wheezing, or rubs  HEART: Regular rate and rhythm; No murmurs, rubs, or gallops  ABDOMEN: Soft, Nontender, Nondistended; Bowel sounds present  EXTREMITIES:  2+ Peripheral Pulses, No clubbing, cyanosis, or edema  LYMPH: No lymphadenopathy noted  SKIN: No rashes or lesions    Consultant(s) Notes Reviewed:  [x ] YES  [ ] NO  Care Discussed with Consultants/Other Providers [ x] YES  [ ] NO    LABS:                        12.5   3.69  )-----------( 222      ( 2022 14:30 )             39.0     02    141  |  106  |  19  ----------------------------<  89  4.2   |  24  |  0.79    Ca    9.3      2022 14:30    TPro  6.5  /  Alb  4.2  /  TBili  0.3  /  DBili  x   /  AST  22  /  ALT  23  /  AlkPhos  100  0223    PT/INR - ( 2022 14:30 )   PT: 13.4 sec;   INR: 1.15 ratio         PTT - ( 2022 14:30 )  PTT:33.7 sec  Urinalysis Basic - ( 2022 07:11 )    Color: Light Yellow / Appearance: Clear / S.028 / pH: x  Gluc: x / Ketone: Negative  / Bili: Negative / Urobili: <2 mg/dL   Blood: x / Protein: Negative / Nitrite: Negative   Leuk Esterase: Negative / RBC: x / WBC x   Sq Epi: x / Non Sq Epi: x / Bacteria: x      CAPILLARY BLOOD GLUCOSE            Urinalysis Basic - ( 2022 07:11 )    Color: Light Yellow / Appearance: Clear / S.028 / pH: x  Gluc: x / Ketone: Negative  / Bili: Negative / Urobili: <2 mg/dL   Blood: x / Protein: Negative / Nitrite: Negative   Leuk Esterase: Negative / RBC: x / WBC x   Sq Epi: x / Non Sq Epi: x / Bacteria: x        RADIOLOGY & ADDITIONAL TESTS:    Imaging Personally Reviewed:  [ ] YES  [ ] NO    HEALTH ISSUES - PROBLEM Dx:  Myalgia    Prophylactic measure    Headache    Leukopenia         ***INCOMPLETE***    Emanuel Singh, PGY-1  Internal Medicine  Pager: [phone], [facility]: [phone]    PROGRESS NOTE:     SUBJECTIVE / OVERNIGHT EVENTS: No acute events overnight. ROS negative for fever, chills, cough, SOB, chest pain, nausea, vomiting, diarrhea, constipation, dysuria.    MEDICATIONS  (STANDING):  influenza   Vaccine 0.5 milliLiter(s) IntraMuscular once    MEDICATIONS  (PRN):  acetaminophen     Tablet .. 650 milliGRAM(s) Oral every 6 hours PRN Moderate Pain (4 - 6)      CAPILLARY BLOOD GLUCOSE        I&O's Summary      PHYSICAL EXAM:  Vital Signs Last 24 Hrs  T(C): 37.2 (2022 04:59), Max: 37.2 (2022 04:59)  T(F): 98.9 (2022 04:59), Max: 98.9 (2022 04:59)  HR: 69 (2022 04:59) (65 - 81)  BP: 109/89 (2022 04:59) (109/89 - 126/79)  BP(mean): --  RR: 18 (2022 04:59) (12 - 18)  SpO2: 98% (2022 04:59) (98% - 100%)    CONSTITUTIONAL: NAD; well-developed;   HEENT: PERRL, Submandibular lymphadenopathy; clear conjunctiva  RESPIRATORY: Normal respiratory effort; lungs are clear to auscultation bilaterally; No Crackles/Rhonchi/Wheezing  CARDIOVASCULAR: Regular rate and rhythm, normal S1 and S2, no murmur/rub/gallop; No lower extremity edema; Peripheral pulses are 2+ bilaterally  ABDOMEN: Nontender to Palpation; normoactive bowel sounds, no rebound/guarding; No hepatosplenomegaly  MUSCLOSKELETAL: no clubbing or cyanosis of digits; no joint swelling or tenderness to palpation  EXTREMITY: Lower extremities Non-tender to palpation; non-erythematous B/L  Neuro: A&Ox3; no focal deficits; Intact 5/5 upper & Lower strength; 5/5 sensation upper & Lower ext; Negative pronator drift; negative romberg test; intact gait  Psych: normal mood; Affect appropirate    LABS:                        12.5   3.69  )-----------( 222      ( 2022 14:30 )             39.0     02    141  |  106  |  19  ----------------------------<  89  4.2   |  24  |  0.79    Ca    9.3      2022 14:30    TPro  6.5  /  Alb  4.2  /  TBili  0.3  /  DBili  x   /  AST  22  /  ALT  23  /  AlkPhos  100      PT/INR - ( 2022 14:30 )   PT: 13.4 sec;   INR: 1.15 ratio         PTT - ( 2022 14:30 )  PTT:33.7 sec      Urinalysis Basic - ( 2022 07:11 )    Color: Light Yellow / Appearance: Clear / S.028 / pH: x  Gluc: x / Ketone: Negative  / Bili: Negative / Urobili: <2 mg/dL   Blood: x / Protein: Negative / Nitrite: Negative   Leuk Esterase: Negative / RBC: x / WBC x   Sq Epi: x / Non Sq Epi: x / Bacteria: x             Emanuel Singh, PGY-1  Internal Medicine  Pager: [phone], [facility]: [phone]    PROGRESS NOTE:     SUBJECTIVE / OVERNIGHT EVENTS: No acute events overnight. Complains of posterior occipital headache, 4/10 in severity. Left shoulder weakness is at its worst in the morning and improves with use. Denies neck stiffness, confusion, memory loss. A&ox4.   ROS negative for fever, chills, cough, SOB, chest pain, nausea, vomiting, diarrhea, constipation, dysuria.    MEDICATIONS  (STANDING):  influenza   Vaccine 0.5 milliLiter(s) IntraMuscular once    MEDICATIONS  (PRN):  acetaminophen     Tablet .. 650 milliGRAM(s) Oral every 6 hours PRN Moderate Pain (4 - 6)      CAPILLARY BLOOD GLUCOSE        I&O's Summary      PHYSICAL EXAM:  Vital Signs Last 24 Hrs  T(C): 37.2 (2022 04:59), Max: 37.2 (2022 04:59)  T(F): 98.9 (2022 04:59), Max: 98.9 (2022 04:59)  HR: 69 (2022 04:59) (65 - 81)  BP: 109/89 (2022 04:59) (109/89 - 126/79)  BP(mean): --  RR: 18 (2022 04:59) (12 - 18)  SpO2: 98% (2022 04:59) (98% - 100%)    CONSTITUTIONAL: NAD; resting comfortably in bed.   HEENT: EOMI. Moist mucous membrane.   RESPIRATORY: CTAB. no wheezes  CARDIOVASCULAR: RRR, no mrg  ABDOMEN: soft, nontender, nondistended. +BSx4  MUSCLOSKELETAL: Neck flexion, extension, lateral movements intact. 5/5 strength in UE b/l. b/l hip flexion and knee flexion intact with 5/5 strength.   EXTREMITY: no edema or tenderness in b/l LE.  Skin: hands with dryness b/l. skin peeling with some ulceration on MCP joints b/l    Neuro: A&Ox4, no focal deficits     LABS:                        12.5   3.69  )-----------( 222      ( 2022 14:30 )             39.0         141  |  106  |  19  ----------------------------<  89  4.2   |  24  |  0.79    Ca    9.3      2022 14:30    TPro  6.5  /  Alb  4.2  /  TBili  0.3  /  DBili  x   /  AST  22  /  ALT  23  /  AlkPhos  100  02-23    PT/INR - ( 2022 14:30 )   PT: 13.4 sec;   INR: 1.15 ratio         PTT - ( 2022 14:30 )  PTT:33.7 sec      Urinalysis Basic - ( 2022 07:11 )    Color: Light Yellow / Appearance: Clear / S.028 / pH: x  Gluc: x / Ketone: Negative  / Bili: Negative / Urobili: <2 mg/dL   Blood: x / Protein: Negative / Nitrite: Negative   Leuk Esterase: Negative / RBC: x / WBC x   Sq Epi: x / Non Sq Epi: x / Bacteria: x             Emanuel Singh, PGY-1  Internal Medicine  Pager: [phone], [facility]: [phone]    PROGRESS NOTE:     SUBJECTIVE / OVERNIGHT EVENTS: No acute events overnight. Complains of posterior occipital headache, 4/10 in severity. Left shoulder weakness is at its worst in the morning and improves with use. Denies neck stiffness, confusion, memory loss. A&ox4.   ROS negative for fever, chills, cough, SOB, chest pain, nausea, vomiting, diarrhea, constipation, dysuria.    MEDICATIONS  (STANDING):  influenza   Vaccine 0.5 milliLiter(s) IntraMuscular once    MEDICATIONS  (PRN):  acetaminophen     Tablet .. 650 milliGRAM(s) Oral every 6 hours PRN Moderate Pain (4 - 6)    PHYSICAL EXAM:  Vital Signs Last 24 Hrs  T(C): 37.2 (2022 04:59), Max: 37.2 (2022 04:59)  T(F): 98.9 (2022 04:59), Max: 98.9 (2022 04:59)  HR: 69 (2022 04:59) (65 - 81)  BP: 109/89 (2022 04:59) (109/89 - 126/79)  RR: 18 (2022 04:59) (12 - 18)  SpO2: 98% (2022 04:59) (98% - 100%)    CONSTITUTIONAL: NAD; resting comfortably in bed.   HEENT: EOMI. Moist mucous membrane.   RESPIRATORY: CTAB. no wheezes  CARDIOVASCULAR: RRR, no mrg  ABDOMEN: soft, nontender, nondistended. +BSx4  MUSCLOSKELETAL: Neck flexion, extension, lateral movements intact. 5/5 strength in UE b/l. b/l hip flexion and knee flexion intact with 5/5 strength.   EXTREMITY: no edema or tenderness in b/l LE.  Skin: hands with dryness b/l. skin peeling with some ulceration on MCP joints b/l    Neuro: A&Ox4, no focal deficits     LABS:                        12.5   3.69  )-----------( 222      ( 2022 14:30 )             39.0     02-    141  |  106  |  19  ----------------------------<  89  4.2   |  24  |  0.79    Ca    9.3      2022 14:30    TPro  6.5  /  Alb  4.2  /  TBili  0.3  /  DBili  x   /  AST  22  /  ALT  23  /  AlkPhos  100  02-23    PT/INR - ( 2022 14:30 )   PT: 13.4 sec;   INR: 1.15 ratio    PTT - ( 2022 14:30 )  PTT:33.7 sec    Urinalysis Basic - ( 2022 07:11 )  Color: Light Yellow / Appearance: Clear / S.028 / pH: x  Gluc: x / Ketone: Negative  / Bili: Negative / Urobili: <2 mg/dL   Blood: x / Protein: Negative / Nitrite: Negative   Leuk Esterase: Negative / RBC: x / WBC x   Sq Epi: x / Non Sq Epi: x / Bacteria: x

## 2022-02-24 NOTE — PATIENT PROFILE ADULT - DOES PATIENT HAVE ADVANCE DIRECTIVE
Left artery Duplex completed. Final results to follow. Patient reports PRN hydroxyzine was effective, reports feeling "much better"  No

## 2022-02-24 NOTE — PROGRESS NOTE ADULT - PROBLEM SELECTOR PLAN 2
Patient age > 50 complaining of B/L Shoulder and Knee pain for months, states the pain is worst when she wakes up and now with a new headache   + RADHA ; no muscle weakness.   Concern for a rheumatological process in the setting of elevated RADHA   Althought less likely polymyalgia rheumatica since acute phase reactants low   - F/u CCP, RF, DSDNA   - Consider rheum consult based on results   - Pain control PRN Patient age > 50 complaining of B/L Shoulder and Knee pain for months, states the mostly weakness with mild pain is worst when she wakes up and now with a new headache   + RADHA ; no muscle weakness on exam.   Concern for a rheumatological process in the setting of elevated RADHA   Although less likely polymyalgia rheumatica since acute phase reactants low. Less likely inflammatory mylagia since no muscle weakness on exam.   - F/u CCP, RF, DSDNA   - f/u CK   - Consider rheum consult based on results   - Pain control PRN Patient age > 50 complaining of B/L Shoulder and Knee pain for months, states the mostly weakness with mild pain is worst when she wakes up and now with a new headache   + RADHA ; no muscle weakness on exam.   Concern for a rheumatological process in the setting of elevated RADHA   Although less likely polymyalgia rheumatica since acute phase reactants low. Less likely inflammatory mylagia since no muscle weakness on exam.   - F/u CCP, RF, DSDNA   - f/u CK   - Xray hands and shoulders ordered.   - Consider rheum consult based on results   - Pain control PRN Patient age > 50 complaining of B/L Shoulder and Knee pain for months, states the mostly weakness with mild pain is worst when she wakes up and now with a new headache   + RADHA ; no muscle weakness on exam.   Concern for a rheumatological process in the setting of elevated RADHA   Although less likely polymyalgia rheumatica since acute phase reactants low. Less likely inflammatory mylagia since no muscle weakness on exam.   - F/u CCP, RF, DSDNA   - f/u CK   - Order Xray hands and shoulders, f/u results  - Consider rheum consult based on results   - Pain control PRN

## 2022-02-24 NOTE — PATIENT PROFILE ADULT - WILL THE PATIENT ACCEPT THE PFIZER COVID-19 VACCINE IF ELIGIBLE AND IT IS AVAILABLE?
[facility]    HPI  Jaleesa Schroeder is a 33 year old female with PMhx of ADD, anxiety, bipolar 2 disorder, depression, GERD, DDD, spinal stenosis, prior alcoholism here for acute visit.    Last seen virtually 5/1/2020  Since then is now pregnant.    Today complains of stool issues.  Says she has had fevers for the past month  Had covid test last week    Pt feels like there are worms in her poop.  Thinks it could be tape worms.  Feels like it has been since she has been pregnant- over the last couple of months.  No blood in stool  No black stools  Has diarrhea every day.  Has some n/v with pregnancy  Had a fever- says normally temp 97- temp has been  for the past month.  Has some GI cramping- anytime she eats or drinks anything she has to have a BM  Having 15 BMs a day she thinks  Seems watery      Current Outpatient Medications   Medication Sig Dispense Refill   • Vyvanse 30 MG capsule      • albuterol 108 (90 Base) MCG/ACT inhaler Inhale 2 puffs into the lungs every 4 hours as needed for Shortness of Breath or Wheezing. 1 Inhaler 0   • famotidine (PEPCID) 20 MG tablet Take 1 tablet by mouth 2 times daily as needed (acid reflux). 60 tablet 5   • clindamycin (CLEOCIN-T) 1 % gel Apply topically 2 times daily. To acne on face and hairline 30 g 5   • clonazePAM (KLONOPIN) 0.5 MG tablet Take 1 tablet by mouth 2 times daily as needed for Anxiety. 60 tablet 1   • naproxen sodium (ALEVE) 220 MG tablet Take 220 mg by mouth as needed.       No current facility-administered medications for this visit.        Review of Systems   Constitutional: Positive for fever. Negative for chills.   Respiratory: Negative for cough.    Cardiovascular: Negative for chest pain.   Gastrointestinal: Positive for abdominal pain, diarrhea and nausea. Negative for blood in stool, constipation, heartburn, melena and vomiting.        Vitals:    09/01/20 1345   BP: 116/74   Pulse: 68   Resp: 16   Temp: 96.8 °F (36  °C)     Weight    09/01/20 1345   Weight: 91.3 kg       Physical Exam   Constitutional: She is oriented to person, place, and time and well-developed, well-nourished, and in no distress.   HENT:   Head: Normocephalic and atraumatic.   Neck: Normal range of motion. Neck supple. No tracheal deviation present. No thyromegaly present.   Cardiovascular: Normal rate, regular rhythm, normal heart sounds and intact distal pulses. Exam reveals no gallop and no friction rub.   No murmur heard.  Pulmonary/Chest: Effort normal and breath sounds normal. No respiratory distress. She has no wheezes. She has no rales. She exhibits no tenderness.   Abdominal: Soft. Bowel sounds are normal. She exhibits no distension and no mass. There is no abdominal tenderness. There is no rebound and no guarding.   Lymphadenopathy:     She has no cervical adenopathy.   Neurological: She is alert and oriented to person, place, and time.   Skin: Skin is warm and dry. No rash noted. She is not diaphoretic. No erythema. No pallor.   Psychiatric: Mood, memory, affect and judgment normal.   Vitals reviewed.       ASSESSMENT AND PLAN:    Jaleesa was seen today for other.    Diagnoses and all orders for this visit:    Acute diarrhea:  Check for parasites.  Pt feels like there are worms in stool, she showed me a stool sample and what she thought was a worm but looks like it could be mucous to me- discussed this is most likely as I do not know where she would have gotten worms- does not eat undercooked meats mostly (except occasionally a burger cooked \"medium\" but doesn't eat pork or fish.  Has not had any recent foreign travel.  But has had watery diarrhea, up to 15 BMs per day over the last month.  Had negative covid test last week  -     GASTROINTESTINAL PARASITES PANEL, BY PCR; Future  -     COMPREHENSIVE METABOLIC PANEL; Future  -     CBC NO DIFFERENTIAL; Future    Worms in stool  -     GASTROINTESTINAL PARASITES PANEL, BY PCR; Future         Problem  List Items Addressed This Visit     None      Visit Diagnoses     Acute diarrhea    -  Primary    Relevant Orders    GASTROINTESTINAL PARASITES PANEL, BY PCR    COMPREHENSIVE METABOLIC PANEL    CBC NO DIFFERENTIAL    Worms in stool        Relevant Orders    GASTROINTESTINAL PARASITES PANEL, BY PCR            DEPRESSION SCREEN  Over the last 2 weeks, how often have you been bothered by the following problems?          PHQ2 Score: 0  PHQ2 Score Interpretation: No further screening needed  1. Little interest or pleasure in activity?: 0  2. Feeling down, depressed, or hopeless?: 0         DEPRESSION ASSESSMENT/PLAN:  Depression screening is negative no further plan needed.     More than 25 minutes were spent on evaluation and care of this pt. More than 50% of this was spent on counseling.     [facility] up [facility]       Not applicable

## 2022-02-25 ENCOUNTER — TRANSCRIPTION ENCOUNTER (OUTPATIENT)
Age: 52
End: 2022-02-25

## 2022-02-25 DIAGNOSIS — F22 DELUSIONAL DISORDERS: ICD-10-CM

## 2022-02-25 LAB
ANION GAP SERPL CALC-SCNC: 10 MMOL/L — SIGNIFICANT CHANGE UP (ref 7–14)
BASOPHILS # BLD AUTO: 0.02 K/UL — SIGNIFICANT CHANGE UP (ref 0–0.2)
BASOPHILS NFR BLD AUTO: 0.6 % — SIGNIFICANT CHANGE UP (ref 0–2)
BUN SERPL-MCNC: 23 MG/DL — SIGNIFICANT CHANGE UP (ref 7–23)
CALCIUM SERPL-MCNC: 9.3 MG/DL — SIGNIFICANT CHANGE UP (ref 8.4–10.5)
CHLORIDE SERPL-SCNC: 105 MMOL/L — SIGNIFICANT CHANGE UP (ref 98–107)
CO2 SERPL-SCNC: 25 MMOL/L — SIGNIFICANT CHANGE UP (ref 22–31)
CREAT SERPL-MCNC: 0.76 MG/DL — SIGNIFICANT CHANGE UP (ref 0.5–1.3)
EOSINOPHIL # BLD AUTO: 0.08 K/UL — SIGNIFICANT CHANGE UP (ref 0–0.5)
EOSINOPHIL NFR BLD AUTO: 2.3 % — SIGNIFICANT CHANGE UP (ref 0–6)
FOLATE SERPL-MCNC: 16.4 NG/ML — SIGNIFICANT CHANGE UP (ref 3.1–17.5)
GLUCOSE SERPL-MCNC: 138 MG/DL — HIGH (ref 70–99)
HCT VFR BLD CALC: 38 % — SIGNIFICANT CHANGE UP (ref 34.5–45)
HGB BLD-MCNC: 12.7 G/DL — SIGNIFICANT CHANGE UP (ref 11.5–15.5)
IANC: 1.79 K/UL — SIGNIFICANT CHANGE UP (ref 1.5–8.5)
IMM GRANULOCYTES NFR BLD AUTO: 0 % — SIGNIFICANT CHANGE UP (ref 0–1.5)
LDH CSF L TO P-CCNC: 24 U/L — SIGNIFICANT CHANGE UP
LDH FLD-CCNC: 24 U/L — SIGNIFICANT CHANGE UP
LYMPHOCYTES # BLD AUTO: 1.4 K/UL — SIGNIFICANT CHANGE UP (ref 1–3.3)
LYMPHOCYTES # BLD AUTO: 39.7 % — SIGNIFICANT CHANGE UP (ref 13–44)
MAGNESIUM SERPL-MCNC: 1.9 MG/DL — SIGNIFICANT CHANGE UP (ref 1.6–2.6)
MCHC RBC-ENTMCNC: 28.9 PG — SIGNIFICANT CHANGE UP (ref 27–34)
MCHC RBC-ENTMCNC: 33.4 GM/DL — SIGNIFICANT CHANGE UP (ref 32–36)
MCV RBC AUTO: 86.6 FL — SIGNIFICANT CHANGE UP (ref 80–100)
MONOCYTES # BLD AUTO: 0.24 K/UL — SIGNIFICANT CHANGE UP (ref 0–0.9)
MONOCYTES NFR BLD AUTO: 6.8 % — SIGNIFICANT CHANGE UP (ref 2–14)
NEUTROPHILS # BLD AUTO: 1.79 K/UL — LOW (ref 1.8–7.4)
NEUTROPHILS NFR BLD AUTO: 50.6 % — SIGNIFICANT CHANGE UP (ref 43–77)
NIGHT BLUE STAIN TISS: SIGNIFICANT CHANGE UP
NON-GYNECOLOGICAL CYTOLOGY STUDY: SIGNIFICANT CHANGE UP
NRBC # BLD: 0 /100 WBCS — SIGNIFICANT CHANGE UP
NRBC # FLD: 0 K/UL — SIGNIFICANT CHANGE UP
PHOSPHATE SERPL-MCNC: 3.3 MG/DL — SIGNIFICANT CHANGE UP (ref 2.5–4.5)
PLATELET # BLD AUTO: 220 K/UL — SIGNIFICANT CHANGE UP (ref 150–400)
POTASSIUM SERPL-MCNC: 3.9 MMOL/L — SIGNIFICANT CHANGE UP (ref 3.5–5.3)
POTASSIUM SERPL-SCNC: 3.9 MMOL/L — SIGNIFICANT CHANGE UP (ref 3.5–5.3)
RBC # BLD: 4.39 M/UL — SIGNIFICANT CHANGE UP (ref 3.8–5.2)
RBC # FLD: 12.2 % — SIGNIFICANT CHANGE UP (ref 10.3–14.5)
SODIUM SERPL-SCNC: 140 MMOL/L — SIGNIFICANT CHANGE UP (ref 135–145)
SPECIMEN SOURCE: SIGNIFICANT CHANGE UP
VIT B12 SERPL-MCNC: 831 PG/ML — SIGNIFICANT CHANGE UP (ref 200–900)
WBC # BLD: 3.53 K/UL — LOW (ref 3.8–10.5)
WBC # FLD AUTO: 3.53 K/UL — LOW (ref 3.8–10.5)

## 2022-02-25 PROCEDURE — 99233 SBSQ HOSP IP/OBS HIGH 50: CPT | Mod: GC

## 2022-02-25 PROCEDURE — 90792 PSYCH DIAG EVAL W/MED SRVCS: CPT

## 2022-02-25 NOTE — BH CONSULTATION LIAISON ASSESSMENT NOTE - RISK ASSESSMENT
Risk factors: none  Protective factors: no current SIIP/HIIP, no h/o SA/SIB, no h/o psych admissions, no access to weapons, no active substance abuse, good physical health, engaged in work or school, dependent child, spirituality, domiciled, social supports, positive therapeutic relationship, engaged in treatment, compliant with treatment, help-seeking behaviors    Overall, pt is at XXXXXXXXXXXXX risk of harm and XXXXXXXXXX does not meet criteria for psychiatric admission. Risk factors: none  Protective factors: no current SIIP/HIIP, no h/o SA/SIB, no h/o psych admissions, no access to weapons, no active substance abuse, good physical health, engaged in work or school, dependent child, spirituality, domiciled, social supports, positive therapeutic relationship, engaged in treatment, compliant with treatment, help-seeking behaviors     Risk factors: Acute changes in health  Protective factors: no current SIIP/HIIP, no h/o SA/SIB, no h/o psych admissions, no access to weapons, no active substance abuse, good physical health, engaged in work or school, dependent child, spirituality, domiciled, social supports, positive therapeutic relationship, engaged in treatment, compliant with treatment, help-seeking behaviors

## 2022-02-25 NOTE — BH CONSULTATION LIAISON ASSESSMENT NOTE - NSSUICPROTFACT_PSY_ALL_CORE
Responsibility to children, family, or others/Identifies reasons for living/Supportive social network of family or friends/Cultural, spiritual and/or moral attitudes against suicide/Engaged in work or school/Positive therapeutic relationships/Ability to cope with stress/Frustration tolerance/Restoration beliefs

## 2022-02-25 NOTE — EEG REPORT - NS EEG TEXT BOX
REPORT OF ROUTINE EEG WITH VIDEO  [facility]: [address] [place] [address], Phone: [phone] [facility]: [address] [place] [address], [place] [address], Phone: [phone] Office: [address] [place] [address], [place] [address], [place] [address], Phone: [phone]  Patient Name: CLAY HUERTA Age: 51 year : 1970 MRN #: -, Location: 926 B Referring Physician: SABINE GALDAMEZ  EEG #: [id number] Study Date: 2022   Start Time: 6:39:32 PM    Study Duration: 20.1 		  Technical Information:					 On Instrument: - Placement and Labeling of Electrodes: The EEG was performed utilizing 20 channels referential EEG connections (coronal over temporal over parasagittal montage) using all standard 10-20 electrode placements with EKG.  Recording was at a sampling rate of 256 samples per second per channel.  Time synchronized digital video recording was done simultaneously with EEG recording.  A low light infrared camera was used for low light recording.  Kenyon and seizure detection algorithms were utilized. CSA Technical Component: Quantitative EEG analysis using a separate Compressed Spectral Array (CSA) software package was conducted in real-time and run at bedside after set up by the technician, digitally displaying the power of electrographic frequencies included in the 1-30Hz band using a graded color map.  This data was reviewed and interpreted independently, and is reported in a separate section below.  History:  ROUTINE EEG AT BEDSIDE 926 B 51 YEAR OLD FEMALE COR: AWAKE / DROWSY P/W: HEADACHE PMH: N/A HV: NOT COMPLETED DUE TO PANDEMIC PHOTIC: COMPLETED A&OX5 CONCERN FOR SEIZURES  -   Medication Tylenol  Study Interpretation:  FINDINGS:  The background was continuous, spontaneously variable and reactive.  During wakefulness, the posteriorly dominant rhythm consisted of symmetric, well modulated 10 Hz activity, with an amplitude to 30 uV, that attenuated to eye opening.  Low amplitude central beta was noted in wakefulness.  Background Slowing: Generalized slowing: none was present. Focal slowing: none was present.  Sleep Background: Drowsiness was characterized by fragmentation, attenuation, and slowing of the background activity.   Sleep was characterized by the presence of vertex waves, symmetric spindles, and K-complexes.  Non-epileptiform activity: None.  Epileptiform Activity:  No epileptiform discharges were present.  Events: No clinical events were recorded. No seizures were recorded.  Activation Procedures:  -Hyperventilation was not performed.   -Photic stimulation was performed and did not elicit any abnormalities.    Artifacts: Intermittent myogenic and movement artifacts were noted.  ECG: The heart rate on single channel ECG was predominantly between 70-80 BPM.  EEG Classification / Summary:  Normal EEG in the awake, drowsy and asleep states.  Clinical Impression:  Normal study. No epileptiform pattern or seizure seen.  Preliminary Fellow Report, final report pending attending review  Alexis Strauss MD Epilepsy Fellow  [place] Room: [phone] On Call Service After Hours: [phone]    REPORT OF ROUTINE EEG WITH VIDEO  [facility]: [address] [place] [address], Phone: [phone] [facility]: [address] [place] [address], [place] [address], Phone: [phone] Office: [address] [place] [address], [place] [address], [place] [address], Phone: [phone]  Patient Name: CLAY HUERTA Age: 51 year : 1970 MRN #: -, Location: 926 B Referring Physician: SABINE GALDAMEZ  EEG #: [id number] Study Date: 2022   Start Time: 6:39:32 PM    Study Duration: 20.1 		  Technical Information:					 On Instrument: - Placement and Labeling of Electrodes: The EEG was performed utilizing 20 channels referential EEG connections (coronal over temporal over parasagittal montage) using all standard 10-20 electrode placements with EKG.  Recording was at a sampling rate of 256 samples per second per channel.  Time synchronized digital video recording was done simultaneously with EEG recording.  A low light infrared camera was used for low light recording.  Kenyon and seizure detection algorithms were utilized. CSA Technical Component: Quantitative EEG analysis using a separate Compressed Spectral Array (CSA) software package was conducted in real-time and run at bedside after set up by the technician, digitally displaying the power of electrographic frequencies included in the 1-30Hz band using a graded color map.  This data was reviewed and interpreted independently, and is reported in a separate section below.  History:  ROUTINE EEG AT BEDSIDE 926 B 51 YEAR OLD FEMALE COR: AWAKE / DROWSY P/W: HEADACHE PMH: N/A HV: NOT COMPLETED DUE TO PANDEMIC PHOTIC: COMPLETED A&OX5 CONCERN FOR SEIZURES  -   Medication Tylenol  Study Interpretation:  FINDINGS:  The background was continuous, spontaneously variable and reactive.  During wakefulness, the posteriorly dominant rhythm consisted of symmetric, well modulated 10 Hz activity, with an amplitude to 30 uV, that attenuated to eye opening.  Low amplitude central beta was noted in wakefulness.  Background Slowing: Generalized slowing: none was present. Focal slowing: none was present.  Sleep Background: Drowsiness was characterized by fragmentation, attenuation, and slowing of the background activity.   Sleep was characterized by the presence of vertex waves, symmetric spindles, and K-complexes.  Non-epileptiform activity: None.  Epileptiform Activity:  No epileptiform discharges were present.  Events: No clinical events were recorded. No seizures were recorded.  Activation Procedures:  -Hyperventilation was not performed.   -Photic stimulation was performed and did not elicit any abnormalities.    Artifacts: Intermittent myogenic and movement artifacts were noted.  ECG: The heart rate on single channel ECG was predominantly between 70-80 BPM.  EEG Classification / Summary:  Normal EEG in the awake, drowsy and asleep states.  Clinical Impression:  Normal study. No epileptiform pattern or seizure seen.   Alexis Strauss MD Epilepsy Fellow  [place] Room: [phone] On Call Service After Hours: [phone]

## 2022-02-25 NOTE — BH CONSULTATION LIAISON ASSESSMENT NOTE - NSBHCONSULTMEDSEVERE_PSY_A_CORE FT
Haldol 2.5mg PO/IM/IV Q6 PRN. HOLD FOR QTC >= 500  Haldol 2.5mg PO/IM/IV Q6 PRN. HOLD FOR QTC > 500

## 2022-02-25 NOTE — PROGRESS NOTE ADULT - ASSESSMENT
51F no significant PMH presenting with Occipital non-radiating headaches for the past 2 months w/ associated loss of balance and no focal neurological deficits. Found to have prior EBV effection, CTH WNL. LP unsuccessful attempt in ED.  Admitted to medicine for further monitoring.      50 yo woman presenting with occipital headaches for the past 2 months w/ associated loss of balance, found to have delusions regarding her neighbors pointing lasers at her, admitted to medicine for further work up; thus far, LP and MRI are unremarkable - may discharge on 2/26/22 with outpatient neuro follow up.

## 2022-02-25 NOTE — PROGRESS NOTE ADULT - SUBJECTIVE AND OBJECTIVE BOX
***INCOMPLETE***    Emanuel Singh, PGY-1  Internal Medicine  Pager: [phone], [facility]: [phone]    PROGRESS NOTE:     SUBJECTIVE / OVERNIGHT EVENTS: No acute events overnight. ROS negative for fever, chills, cough, SOB, chest pain, nausea, vomiting, diarrhea, constipation, dysuria.    MEDICATIONS  (STANDING):  influenza   Vaccine 0.5 milliLiter(s) IntraMuscular once    MEDICATIONS  (PRN):  acetaminophen     Tablet .. 650 milliGRAM(s) Oral every 6 hours PRN Moderate Pain (4 - 6)      CAPILLARY BLOOD GLUCOSE        I&O's Summary    2022 07:01  -  2022 07:00  --------------------------------------------------------  IN: 500 mL / OUT: 700 mL / NET: -200 mL        PHYSICAL EXAM:  Vital Signs Last 24 Hrs  T(C): 36.9 (2022 05:46), Max: 36.9 (2022 05:46)  T(F): 98.5 (2022 05:46), Max: 98.5 (2022 05:46)  HR: 64 (2022 05:46) (64 - 76)  BP: 106/62 (2022 05:46) (106/60 - 115/85)  BP(mean): --  RR: 18 (2022 05:46) (17 - 18)  SpO2: 100% (2022 05:46) (95% - 100%)    CONSTITUTIONAL: NAD, well-developed  RESPIRATORY: Normal respiratory effort; lungs are clear to auscultation bilaterally  CARDIOVASCULAR: Regular rate and rhythm, normal S1 and S2, no murmur/rub/gallop; No lower extremity edema; radial pulses are 2+ bilaterally  ABDOMEN: Nontender to palpation, no rebound/guarding, nondistended, bowel soudns present  MUSCLOSKELETAL: no clubbing or cyanosis of digits; no joint swelling   PSYCH: A+O to person, place, and time; affect appropriate    LABS:                        12.5   3.08  )-----------( 208      ( 2022 08:05 )             38.1         142  |  107  |  16  ----------------------------<  87  4.3   |  25  |  0.82    Ca    9.3      2022 08:05  Phos  3.8       Mg     1.90         TPro  6.3  /  Alb  4.1  /  TBili  0.6  /  DBili  x   /  AST  23  /  ALT  17  /  AlkPhos  97      PT/INR - ( 2022 14:30 )   PT: 13.4 sec;   INR: 1.15 ratio         PTT - ( 2022 14:30 )  PTT:33.7 sec  CARDIAC MARKERS ( 2022 09:48 )  x     / x     / 127 U/L / x     / x          Urinalysis Basic - ( 2022 07:11 )    Color: Light Yellow / Appearance: Clear / S.028 / pH: x  Gluc: x / Ketone: Negative  / Bili: Negative / Urobili: <2 mg/dL   Blood: x / Protein: Negative / Nitrite: Negative   Leuk Esterase: Negative / RBC: x / WBC x   Sq Epi: x / Non Sq Epi: x / Bacteria: x        Culture - CSF with Gram Stain (collected 2022 22:37)  Source: .CSF CSF lumbar  Gram Stain (2022 23:15):    No polymorphonuclear cells seen    No organisms seen    by cytocentrifuge         Emanuel Singh, PGY-1  Internal Medicine  Pager: [phone], [facility]: [phone]    PROGRESS NOTE:     SUBJECTIVE / OVERNIGHT EVENTS: No acute events overnight. Patient continues to report she feels well. ROS negative for fever, chills, cough, SOB, chest pain, nausea, vomiting, diarrhea, constipation, dysuria.    MEDICATIONS  (STANDING):  influenza   Vaccine 0.5 milliLiter(s) IntraMuscular once    MEDICATIONS  (PRN):  acetaminophen     Tablet .. 650 milliGRAM(s) Oral every 6 hours PRN Moderate Pain (4 - 6)      CAPILLARY BLOOD GLUCOSE        I&O's Summary    2022 07:01  -  2022 07:00  --------------------------------------------------------  IN: 500 mL / OUT: 700 mL / NET: -200 mL        PHYSICAL EXAM:  Vital Signs Last 24 Hrs  T(C): 36.9 (2022 05:46), Max: 36.9 (2022 05:46)  T(F): 98.5 (2022 05:46), Max: 98.5 (2022 05:46)  HR: 64 (2022 05:46) (64 - 76)  BP: 106/62 (2022 05:46) (106/60 - 115/85)  BP(mean): --  RR: 18 (2022 05:46) (17 - 18)  SpO2: 100% (2022 05:46) (95% - 100%)    CONSTITUTIONAL: NAD; resting comfortably in bed.   HEENT: EOMI. Moist mucous membrane.   RESPIRATORY: CTAB. no wheezes  CARDIOVASCULAR: RRR, no mrg  ABDOMEN: soft, nontender, nondistended. +BSx4  EXTREMITY: no edema or tenderness in b/l LE.  Skin: hands with dryness b/l  Neuro: no focal deficits; though process is linear though content is bizarre    LABS:                        12.5   3.08  )-----------( 208      ( 2022 08:05 )             38.1     02-    142  |  107  |  16  ----------------------------<  87  4.3   |  25  |  0.82    Ca    9.3      2022 08:05  Phos  3.8       Mg     1.90         TPro  6.3  /  Alb  4.1  /  TBili  0.6  /  DBili  x   /  AST  23  /  ALT  17  /  AlkPhos  97      PT/INR - ( 2022 14:30 )   PT: 13.4 sec;   INR: 1.15 ratio         PTT - ( 2022 14:30 )  PTT:33.7 sec  CARDIAC MARKERS ( 2022 09:48 )  x     / x     / 127 U/L / x     / x          Urinalysis Basic - ( 2022 07:11 )    Color: Light Yellow / Appearance: Clear / S.028 / pH: x  Gluc: x / Ketone: Negative  / Bili: Negative / Urobili: <2 mg/dL   Blood: x / Protein: Negative / Nitrite: Negative   Leuk Esterase: Negative / RBC: x / WBC x   Sq Epi: x / Non Sq Epi: x / Bacteria: x        Culture - CSF with Gram Stain (collected 2022 22:37)  Source: .CSF CSF lumbar  Gram Stain (2022 23:15):    No polymorphonuclear cells seen    No organisms seen    by cytocentrifuge

## 2022-02-25 NOTE — BH CONSULTATION LIAISON ASSESSMENT NOTE - DESCRIPTION
Single female. lives in single family residence in [place], with 22 yo daughter. Employed with [employer].  No known substance or alcohol abuse.
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
## 2022-02-25 NOTE — BH CONSULTATION LIAISON ASSESSMENT NOTE - NSBHCHARTREVIEWVS_PSY_A_CORE FT
Vital Signs Last 24 Hrs  T(C): 36.9 (25 Feb 2022 05:46), Max: 36.9 (25 Feb 2022 05:46)  T(F): 98.5 (25 Feb 2022 05:46), Max: 98.5 (25 Feb 2022 05:46)  HR: 64 (25 Feb 2022 05:46) (64 - 76)  BP: 106/62 (25 Feb 2022 05:46) (106/60 - 115/85)  BP(mean): --  RR: 18 (25 Feb 2022 05:46) (17 - 18)  SpO2: 100% (25 Feb 2022 05:46) (95% - 100%)

## 2022-02-25 NOTE — DISCHARGE NOTE PROVIDER - NSDCFUADDAPPT_GEN_ALL_CORE_FT
Please ensure you see your primary care provider within the next week after discharge.  Please make an appointment to see either Dr. Nelson, or any neurologist who is available to see you within the week following your discharge from the hospital.

## 2022-02-25 NOTE — DISCHARGE NOTE PROVIDER - NSDCHOSPICE_GEN_A_CORE
No Xenograft Text: The defect edges were debeveled with a #15 scalpel blade.  Given the location of the defect, shape of the defect and the proximity to free margins a xenograft was deemed most appropriate.  The graft was then trimmed to fit the size of the defect.  The graft was then placed in the primary defect and oriented appropriately.

## 2022-02-25 NOTE — PROGRESS NOTE ADULT - PROBLEM SELECTOR PLAN 4
DVT Prophylaxis; Low VTE risk assessment   Diet: Regular   Dispo: Pending medical course     Fall precautions   Full code
DVT Prophylaxis; Low VTE risk assessment   Diet: Regular   Dispo: Pending medical course     Fall precautions   Full code

## 2022-02-25 NOTE — BH CONSULTATION LIAISON ASSESSMENT NOTE - NSBHCONSULTPRIMARYDISCUSSYES_PSY_A_CORE FT
Discussed with medicine team that if cleared by medicine+ neurology over weekend, patient does not meet criteria for an involuntary psychiatric admission. Safe discharge plan to be coordinated by team with family. If patient were to remain admitted, will follow up on Monday.

## 2022-02-25 NOTE — CONSULT NOTE ADULT - SUBJECTIVE AND OBJECTIVE BOX
Neurology Consultation  Colt Mario, PGY-2      HPI: INCOMPLETE, to update  51F no significant PMH presenting with Occipital non-radiating headaches for the past 2 months w/ associated loss of balance. Patient was recently in ED a few days ago for headache and found to be EBV +, patient was discharged and told by PCP to come back to ED to r/o EBV encephalitis. Patient states that headache started about 2 months ago and believes it is because "her neighbors are pointing lasers at her head", and has been "wearing towels on her head to protect her from the lasers". She says the headache started randomly, on/off throughout the day and are non-radiating. She does not take anything for the pain. In addition, she noticed that she has more difficult time walking because she feels her balance is "off"; denies falls, or syncope events. She denies any trauma, recent sick contacts or recent traveling. Also is complaining of B/L shoulder and knee pain that has been going on for months. Otherwise patient says she is in good health, and denies other symptoms. No nneurological focal deficits. No etoh use, no smoking. Does not take any medications, aside from OTC vitamins. Denies fevers, chills, blurry vision, neck pain, SOB, CP, Palpitations, Nausea/vomiting, abd pain, diarrhea, constipation, parasthesias, numbness, incontinence, dysurea, hematochezia, melena.    Otherwise denies fever, chills, headaches, vision changes, blurry vision, double vision, nausea, vomiting, hearing change, focal weakness, focal numbness, parasthesias, bowel/ bladder incontinence.      PAST MEDICAL & SURGICAL HISTORY:  No pertinent past medical history    No significant past surgical history    FAMILY HISTORY:  No pertinent family history in first degree relatives      SOCIAL HISTORY: no smoking, alcohol, drug use hx    MEDICATIONS (HOME):  Home Medications:  Multiple Vitamins oral capsule: 1 cap(s) orally once a day (2022 22:16)    MEDICATIONS  (STANDING):  influenza   Vaccine 0.5 milliLiter(s) IntraMuscular once    MEDICATIONS  (PRN):  acetaminophen     Tablet .. 650 milliGRAM(s) Oral every 6 hours PRN Moderate Pain (4 - 6)    ALLERGIES/INTOLERANCES:  Allergies  No Known Allergies    Intolerances    VITALS & EXAMINATION:  Vital Signs Last 24 Hrs  T(C): 36.3 (2022 12:53), Max: 36.9 (2022 05:46)  T(F): 97.3 (2022 12:53), Max: 98.5 (2022 05:46)  HR: 66 (2022 12:53) (64 - 76)  BP: 100/60 (2022 12:53) (100/60 - 115/85)  BP(mean): --  RR: 17 (2022 12:53) (17 - 18)  SpO2: 100% (2022 12:53) (95% - 100%)     LABORATORY:  CBC                       12.7   3.53  )-----------( 220      ( 2022 06:59 )             38.0     Chem 02-25    140  |  105  |  23  ----------------------------<  138<H>  3.9   |  25  |  0.76    Ca    9.3      2022 06:59  Phos  3.3     02-25  Mg     1.90     02-25    TPro  6.3  /  Alb  4.1  /  TBili  0.6  /  DBili  x   /  AST  23  /  ALT  17  /  AlkPhos  97  02-24    LFTs LIVER FUNCTIONS - ( 2022 08:05 )  Alb: 4.1 g/dL / Pro: 6.3 g/dL / ALK PHOS: 97 U/L / ALT: 17 U/L / AST: 23 U/L / GGT: x           Coagulopathy PT/INR - ( 2022 14:30 )   PT: 13.4 sec;   INR: 1.15 ratio         PTT - ( 2022 14:30 )  PTT:33.7 sec  Lipid Panel   A1c   Cardiac enzymes CARDIAC MARKERS ( 2022 09:48 )  x     / x     / 127 U/L / x     / x          U/A Urinalysis Basic - ( 2022 07:11 )    Color: Light Yellow / Appearance: Clear / S.028 / pH: x  Gluc: x / Ketone: Negative  / Bili: Negative / Urobili: <2 mg/dL   Blood: x / Protein: Negative / Nitrite: Negative   Leuk Esterase: Negative / RBC: x / WBC x   Sq Epi: x / Non Sq Epi: x / Bacteria: x      CSF  Other    STUDIES & IMAGING: (EEG, CT, MR, U/S, TTE/LATASHA): Neurology Consultation  Colt Mario, PGY-2      HPI: Patient SL is a 52yo F no sig PMH who presents ot hospital for constellation of symptoms and in setting of positive EBV testing. Reports that on Aug 25th she began to hear strange noises around her house in particular regions that when recorded were also appreciated by daughter. After leaving her house, would still have persistent tinnitus L ear greater than R ear. Had hearing test with ENT that was unremarkable per her. Then in late august/september started to see a green laser shown through her window into her home. For the last few months has been experiencing "lasers green or red pointing and burning over her body" and even under the sheets. Has also for the last few months experienced occipital headaches, vision changes (does wear glasses, may be worsening near distance) and being off balance causing difficulty walking. No associated nausea, vomiting, slurred speech, neck stiffness, visual distortions/ double vision/ obscurations, chest pain, shortness of breath, cough, fevers, chills, focal weakness, focal numbness, parasthesias, bowel/bladder incontinence. No recent chemical/enviornmental exposures, pet exposures, sick contacts, recent travels, rash. Did have a envirnonmental magnetic field assessment of her home which she states was elevated readings in her house. The report was shown on the phone and mentioned the smell of mold in her house. Called daughter who reports mold in basement and a bathroom that the patient does not use. Does not feel this contributing to symptoms. Daughter denies similar symptoms including fatigue, nausea, vomiting, neurologic symptoms. She was recently in ED a few days ago for headache and found to be EBV +, patient was discharged and told by PCP to come back to ED to r/o EBV encephalitis.       PAST MEDICAL & SURGICAL HISTORY:  No pertinent past medical history    No significant past surgical history    FAMILY HISTORY:  No pertinent family history in first degree relatives    SOCIAL HISTORY: no smoking, alcohol, drug use hx    MEDICATIONS (HOME):  Home Medications:  Multiple Vitamins oral capsule: 1 cap(s) orally once a day (2022 22:16)    MEDICATIONS  (STANDING):  influenza   Vaccine 0.5 milliLiter(s) IntraMuscular once    MEDICATIONS  (PRN):  acetaminophen     Tablet .. 650 milliGRAM(s) Oral every 6 hours PRN Moderate Pain (4 - 6)    ALLERGIES/INTOLERANCES:  Allergies  No Known Allergies    Intolerances    VITALS & EXAMINATION:  Vital Signs Last 24 Hrs  T(C): 36.3 (2022 12:53), Max: 36.9 (2022 05:46)  T(F): 97.3 (2022 12:53), Max: 98.5 (2022 05:46)  HR: 66 (2022 12:53) (64 - 76)  BP: 100/60 (2022 12:53) (100/60 - 115/85)  BP(mean): --  RR: 17 (2022 12:53) (17 - 18)  SpO2: 100% (2022 12:53) (95% - 100%)     General:  Constitutional: Female, appears stated age, in no apparent distress including pain  Head: Normocephalic & atraumatic; Eyes: clear sclera; Neck: supple, can flex/extend  Extremities: No cyanosis, clubbing; Skin: No regulo edema of LE, chronic skin changes  Resp: breathing comfortably     Neurological (>12):  MS: Awake, alert, oriented to person, place, situation, time. Normal affect. Follows all commands. Cooperative.   Language: Speech is clear, fluent, intact with normal tone/rate/ volume and good repetition,  comprehension, registration of words. No perseverance.   *Patient able to provide a linear timeline with clear thought process and with high functioning/ reasoning for explanations.    CNs: PERRL (R = 3mm, L = 3mm). VFF. EOMI 2-3 beats L gaze horizontal nystagmus. V1-3 intact to LT, No facial asymmetry b/l, full eye closure strength b/l. Hearing grossly normal (rubbing fingers) b/l.  Gag reflex deferred.     Motor: Normal muscle bulk & tone. No noticeable tremor or seizure. No pronator drift.              Deltoid	Biceps	Triceps	   R	5	5	5	5		 	  L	5	5	5	5			  	H-Flex	K-Ext	D-Flex	P-Flex  R	5	5	5	5			 	   L	5	5	5	5		     Sensation: Intact to LT/PP/Temp/Vibration/Position b/l., Cortical: Extinction on DSS (neglect): none  Reflexes:              Biceps(C5)       BR(C6)     Triceps(C7)               Patellar(L4)        Plantar Resp  R	2	          2	             2		        2		    	Down   L	2	          2	             2		        2		 	Down   No ankle clonus  Coordination: No dysmetria to FTN  Gait:  Normal stance and gait.     LABORATORY:  CBC                       12.7   3.53  )-----------( 220      ( 2022 06:59 )             38.0     Chem 02-25    140  |  105  |  23  ----------------------------<  138<H>  3.9   |  25  |  0.76    Ca    9.3      2022 06:59  Phos  3.3     02-25  Mg     1.90     -    TPro  6.3  /  Alb  4.1  /  TBili  0.6  /  DBili  x   /  AST  23  /  ALT  17  /  AlkPhos  97  02-24    LFTs LIVER FUNCTIONS - ( 2022 08:05 )  Alb: 4.1 g/dL / Pro: 6.3 g/dL / ALK PHOS: 97 U/L / ALT: 17 U/L / AST: 23 U/L / GGT: x           Coagulopathy PT/INR - ( 2022 14:30 )   PT: 13.4 sec;   INR: 1.15 ratio         PTT - ( 2022 14:30 )  PTT:33.7 sec  Lipid Panel   A1c   Cardiac enzymes CARDIAC MARKERS ( 2022 09:48 )  x     / x     / 127 U/L / x     / x        U/A Urinalysis Basic - ( 2022 07:11 )    Color: Light Yellow / Appearance: Clear / S.028 / pH: x  Gluc: x / Ketone: Negative  / Bili: Negative / Urobili: <2 mg/dL   Blood: x / Protein: Negative / Nitrite: Negative   Leuk Esterase: Negative / RBC: x / WBC x   Sq Epi: x / Non Sq Epi: x / Bacteria: x    CSF  Other    STUDIES & IMAGING: (EEG, CT, MR, U/S, TTE/LATASHA):

## 2022-02-25 NOTE — DISCHARGE NOTE PROVIDER - NSDCCPCAREPLAN_GEN_ALL_CORE_FT
PRINCIPAL DISCHARGE DIAGNOSIS  Diagnosis: Headache  Assessment and Plan of Treatment: Please continue acetaminophen 650 mg every 6 hours or ibuprofen 400 mg every 6-8 hours as needed.      SECONDARY DISCHARGE DIAGNOSES  Diagnosis: Paranoia  Assessment and Plan of Treatment: You have expressed some thoughts of paranoia. Please follow up with your primary care doctor. There are still some studies pending to rule out autoimmune entities and you should follow up with neurology for these results.        PRINCIPAL DISCHARGE DIAGNOSIS  Diagnosis: Headache  Assessment and Plan of Treatment: Please continue acetaminophen 650 mg every 6 hours or ibuprofen 400 mg every 6-8 hours as needed.      SECONDARY DISCHARGE DIAGNOSES  Diagnosis: Paranoia  Assessment and Plan of Treatment: You have expressed some thoughts of paranoia while you were admitted. We did an MRI, a study called an EEG which looks at brain waves, and a lumbar puncture to study the fluid that surrounds your brain and spinal cord. All of the results that we have gotten back to far have been normal. You were seen by a psychiatrist, who believed your paranoia was due to a process that could be more easily figured out by a neurologist. There are still some studies pending to rule out autoimmune entities. Please follow up with a neurologist to determine what the cause of your paranoia is - we will leave their information in your discharge paperwork. If you feel threatened to the point that you are not functioning well or develop the desire to hurt yourself or others, please return to the hospital.     PRINCIPAL DISCHARGE DIAGNOSIS  Diagnosis: Headache  Assessment and Plan of Treatment: Please continue acetaminophen 650 mg every 6 hours or ibuprofen 400 mg every 6-8 hours as needed.      SECONDARY DISCHARGE DIAGNOSES  Diagnosis: Delusions  Assessment and Plan of Treatment: You have expressed some thoughts of paranoia while you were admitted. We did an MRI, a study called an EEG which looks at brain waves, and a lumbar puncture to study the fluid that surrounds your brain and spinal cord. All of the results that we have gotten back to far have been normal. You were seen by a psychiatrist, who believed your paranoia was due to a process that could be more easily figured out by a neurologist. There are still some studies pending to rule out autoimmune entities. Please follow up with a neurologist to determine what the cause of your paranoia is - we will leave their information in your discharge paperwork. If you feel threatened to the point that you are not functioning well or develop the desire to hurt yourself or others, please return to the hospital.

## 2022-02-25 NOTE — BH CONSULTATION LIAISON ASSESSMENT NOTE - NSICDXBHSECONDARYDX_PSY_ALL_CORE
Myalgia   M79.10  Prophylactic measure   Z29.9  Headache   R51.9  Leukopenia   D72.819

## 2022-02-25 NOTE — BH CONSULTATION LIAISON ASSESSMENT NOTE - CURRENT MEDICATION
MEDICATIONS  (STANDING):  influenza   Vaccine 0.5 milliLiter(s) IntraMuscular once    MEDICATIONS  (PRN):  acetaminophen     Tablet .. 650 milliGRAM(s) Oral every 6 hours PRN Moderate Pain (4 - 6)

## 2022-02-25 NOTE — BH CONSULTATION LIAISON ASSESSMENT NOTE - NSBHCHARTREVIEWLAB_PSY_A_CORE FT
12.7   3.53  )-----------( 220      ( 25 Feb 2022 06:59 )             38.0     02-25    140  |  105  |  23  ----------------------------<  138<H>  3.9   |  25  |  0.76    Ca    9.3      25 Feb 2022 06:59  Phos  3.3     02-25  Mg     1.90     02-25    TPro  6.3  /  Alb  4.1  /  TBili  0.6  /  DBili  x   /  AST  23  /  ALT  17  /  AlkPhos  97  02-24

## 2022-02-25 NOTE — DISCHARGE NOTE PROVIDER - CARE PROVIDER_API CALL
Frandy Nelson)  Neurology; Vascular Neurology  [address] [facility] [address], [address]  [place] [address]  Phone: [phone]  Fax: [phone]  Follow Up Time: 1 week   Frandy Nelson)  Neurology; Vascular Neurology  [address] [facility] [address] [place], [address]  [place] [address]  Phone: [phone]  Fax: [phone]  Follow Up Time: 1 week    [place] [address]  Phone: [phone]  Fax: [phone]  Follow Up Time: 1 week

## 2022-02-25 NOTE — PROGRESS NOTE ADULT - PROBLEM SELECTOR PLAN 2
Patient age > 50 complaining of B/L Shoulder and Knee pain for months, states the mostly weakness with mild pain is worst when she wakes up and now with a new headache   + RADHA ; no muscle weakness on exam.   Concern for a rheumatological process in the setting of elevated RADHA   Although less likely polymyalgia rheumatica since acute phase reactants low. Less likely inflammatory mylagia since no muscle weakness on exam.   - F/u CCP, RF, DSDNA   - f/u CK   - Order Xray hands and shoulders, f/u results  - Consider rheum consult based on results   - Pain control PRN B/L Shoulder and Knee pain for months, states the mostly weakness with mild pain is worst when she wakes up and now with a new headache   + RADHA, neg CCPA, neg RF, creatine kinase wnl

## 2022-02-25 NOTE — PROGRESS NOTE ADULT - PROBLEM SELECTOR PLAN 1
Patient c/o headaches for 2 months with associated recent loss of balance.   No recent traveling, no sick contacts; no focal neurological findigns on exam   No signs of infection; negative nuchal rigidity; negative brudzinsky   Electrolytes WNL; CTH WNL   Prior EBV+, concern for EBV encephalitis vs migraine vs less likely bacterial meningitis vs less likely GCA   - There was an Unsuccessful LP attempt in ED   - Plan for LP this afternoon with primary team. Patient made aware.   - MRI w/wo IV ordered.  - Paraneoplastic labs; history c/f hallucinations.   - Depending on LP and MRI results will consider neuro consult   - Consider psych as well depending on results Presented w/headaches for 2 months, found to have delusions/hallucinations that her neighbors have been harming her with lasers, though her thought processes are linear and she does not appear overtly psychotic  Unclear whether etiology psych vs neuro, workup thus far including CSF analysis and MRI have been unremarkable  - f/u paraneoplastic labs  - appreciate neuro recs - will order pan scan as part of paraneoplastic workup; will f/u paraneoplastic and AIE labs  - appreciate psych recs - underlying process thought to be organic Presented w/headaches for 2 months, found to have delusions/hallucinations that her neighbors have been harming her with lasers, though her thought processes are linear and she does not appear overtly psychotic  Unclear whether etiology psych vs neuro, workup thus far including CSF analysis and MRI have been unremarkable  - appreciate neuro recs - will order pan scan as part of paraneoplastic workup; will f/u paraneoplastic and AIE labs  - appreciate psych recs - underlying process thought to be organic, no need to start on psychiatric meds

## 2022-02-25 NOTE — BH CONSULTATION LIAISON ASSESSMENT NOTE - DIFFERENTIAL
Psychosis, unspecified etiology   R/o organic cause ?AIE Psychosis, unspecified etiology vs. delusional d/o   R/o organic cause ?AIE

## 2022-02-25 NOTE — CONSULT NOTE ADULT - ATTENDING COMMENTS
Abdullahi   50yo F no sig PMH who presents ot hospital for positive EBV testing in setting of months of headache, vision changes, being off balance, and in setting of chronic beliefs that her neighbors are pointing lasers at her while she is in her home and being burned by them. She was recently in ED a few days ago for headache and found to be EBV +, patient was discharged and told by PCP to come back to ED to r/o EBV encephalitis.      VS 97.6F, HR80, /71, RR12, 100%RA.   Exam: unremarkable neuro exam  Labs: wbc 3.37 (penia), wnl coag, cmp wnl,  wnl b12/folate, esr, TSH, CK UA, utox, RF, CCP, covid,   Labs Positive for RADHA w/ IgM and IgG EBV, syphilis   CSF: glucose 62, protein 32, LDH 24, clear colorless, TNC 1, lymph%100, PCR unrevealing   MRI brain w/ w/o con: unremarkable  EKG NSR  EEG Normal EEG in the awake, drowsy and asleep states.    Impression: Patient presents with delusional content versus illusions and nonspecific and nonlocalizing neurologic symptoms but in setting of high cognitive reserve and good fund of knowledge with linear thought process. She visited ENT and obtained hearing test, had EMF testing, etc. Neurologic exam otherwise unremarkable. MRI, EEG, and initial CSF studies are also unremarkable and unrevealing thus far. Currently at this time, there is low clinical suspicion for AIE. She does not have decreased level of orientation, gait instability on exam, memory difficulties, evidence of hyperreflexia, inattention, automatisms, or catatonic behavior. Also low evidence for progressive neurodegenerative disease at this time. Given the EMF report suggests the  noted smell of mold, can consider workup for possible exposure causing patient to have feeling of being unwell which she may have extrapolated to delusional thoughts, but again believe this may be low yield.   o/e 2/26 AAOx3, good insight, no weakness, numbness. at baseline     Recommendations:   - no HA no confusion   - supportive care.  back to baseline.  can send blood work and add on CSF studies above. would not get another LP  - outpatient f/u   - check FS, glucose control <180  - GI/DVT ppx  - Counseling on diet, exercise, and medication adherence was done  - Counseling on smoking cessation and alcohol consumption offered when appropriate.  - Pain assessed and judicious use of narcotics when appropriate was discussed.    - Stroke education given when appropriate.  - Importance of fall prevention discussed.   - Differential diagnosis and plan of care discussed with patient and/or family and primary team  - Thank you for allowing me to participate in the care of this patient. Call with questions.   - no objection to d/c plan from neuro standpoint   Frandy Nelson MD  Vascular Neurology  Office: [phone]

## 2022-02-25 NOTE — DISCHARGE NOTE PROVIDER - HOSPITAL COURSE
History of Present Illness:   51F no significant PMH presenting with Occipital non-radiating headaches for the past 2 months w/ associated loss of balance. Patient was recently in ED a few days ago for headache and found to be EBV +, patient was discharged and told by PCP to come back to ED to r/o EBV encephalitis. Patient states that headache started about 2 months ago and believes it is because "her neighbors are pointing lasers at her head", and has been "wearing towels on her head to protect her from the lasers". She says the headache started randomly, on/off throughout the day and are non-radiating. She does not take anything for the pain. In addition, she noticed that she has more difficult time walking because she feels her balance is "off"; denies falls, or syncope events. She denies any trauma, recent sick contacts or recent traveling. Also is complaining of B/L shoulder and knee pain that has been going on for months. Otherwise patient says she is in good health, and denies other symptoms. No nneurological focal deficits. No etoh use, no smoking. Does not take any medications, aside from OTC vitamins. Denies fevers, chills, blurry vision, neck pain, SOB, CP, Palpitations, Nausea/vomiting, abd pain, diarrhea, constipation, parasthesias, numbness, incontinence, dysurea, hematochezia, melena.    Patient was admitted for further evaluation of her paranoid delusions.  She underwent MRI and LP with preliminary normal results.  Evaluated by Psychiatry deeming not an acute danger to herself or others and psychotropic medications were deferred.  Underwent CT chest/abdomen/pelvis demonstrating ________  At the time of her discharge Autoimmune studies in serum/CSF were pending. Patient to follow up with neurology. History of Present Illness:   "51F no significant PMH presenting with Occipital non-radiating headaches for the past 2 months w/ associated loss of balance. Patient was recently in ED a few days ago for headache and found to be EBV +, patient was discharged and told by PCP to come back to ED to r/o EBV encephalitis. Patient states that headache started about 2 months ago and believes it is because "her neighbors are pointing lasers at her head", and has been "wearing towels on her head to protect her from the lasers". She says the headache started randomly, on/off throughout the day and are non-radiating. She does not take anything for the pain. In addition, she noticed that she has more difficult time walking because she feels her balance is "off"; denies falls, or syncope events. She denies any trauma, recent sick contacts or recent traveling. Also is complaining of B/L shoulder and knee pain that has been going on for months. Otherwise patient says she is in good health, and denies other symptoms. No nneurological focal deficits. No etoh use, no smoking. Does not take any medications, aside from OTC vitamins. Denies fevers, chills, blurry vision, neck pain, SOB, CP, Palpitations, Nausea/vomiting, abd pain, diarrhea, constipation, parasthesias, numbness, incontinence, dysurea, hematochezia, melena."    Hospital course    Patient was admitted for further evaluation of her paranoid delusions. Her thought process was linear and she did not appear overtly psychotic, which raised suspicion for an organic etiology. She had no focal neurologic deficits.  She underwent MRI and spot EEG (both wnl) and LP with preliminary normal results.  Evaluated by Psychiatry deeming not an acute danger to herself or others and psychotropic medications were deferred.  Patient also complained of myalgias on admission - she had a strongly positive RADHA, though other rheumatologic labs were unremarkable including ESR, CRP, CK, CCPA, and RF.  Underwent CT chest/abdomen/pelvis to evaluate for possible paraneoplastic process, demonstrating ________  At the time of her discharge Autoimmune studies in serum/CSF were pending. She was hemodynamically stable and optimized to follow up with neurology as an outpatient.

## 2022-02-25 NOTE — DISCHARGE NOTE PROVIDER - NSDCCPTREATMENT_GEN_ALL_CORE_FT
PRINCIPAL PROCEDURE  Procedure: MRI head  Findings and Treatment:   < end of copied text >  CLINICAL INFORMATION: Persistent headaches.  TECHNIQUE: Multiplanar multisequential MRI of the brain was acquired with   and without the administration of IV gadolinium. 7.5 cc's of IV Gadavist   was administered for the purposes of this examination. 0 cc were   discarded.  COMPARISON: Most recent prior head CT study from 2/18/2022. No prior   brain MRI studies are available for comparison.  FINDINGS: The brain parenchyma is normal in signal and morphology. There   is no evidence of acute ischemia on the diffusion-weighted images. There   is no abnormal brain parenchymal or leptomeningeal enhancement.  Ventricularsize and configuration is unremarkable. No abnormal extra   axial fluid collections are noted. Flow-voids are noted throughout the   major intracranial vessels, on the T2 weighted images, consistent with   their patency. The sella turcica and posterior fossa are unremarkable.  The paranasal sinuses and mastoid air cells are clear. Calvarial signal   is within normal limits. The orbits appear unremarkable.  IMPRESSION: Unremarkable contrast enhanced brain MRI examination.  --- End of Report ---  ELIDA BILLY MD; Attending Radiologist< from: MR Head w/wo IV Cont (02.24.22 @ 03:08) >

## 2022-02-25 NOTE — BH CONSULTATION LIAISON ASSESSMENT NOTE - CASE SUMMARY
Met with the patient. I have reviewed above note and made edits as needed. I have coordinated care with medicine hospitalist, and also with neurology team. Case discussed with PA student, impression and plan discussed and agreed upon  Discussed with medicine team that if cleared by medicine+ neurology over weekend, patient does not meet criteria for an involuntary psychiatric admission. Safe discharge plan to be coordinated by team with family. If patient were to remain admitted, will follow up on Monday.

## 2022-02-25 NOTE — PROGRESS NOTE ADULT - PROBLEM SELECTOR PLAN 3
mild, afebrile. can check UA, trend cbc DVT Prophylaxis: none, low risk patient  Diet: Regular   Dispo: home  Code status: full
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
## 2022-02-26 ENCOUNTER — TRANSCRIPTION ENCOUNTER (OUTPATIENT)
Age: 52
End: 2022-02-26

## 2022-02-26 VITALS
DIASTOLIC BLOOD PRESSURE: 72 MMHG | TEMPERATURE: 98 F | HEART RATE: 61 BPM | RESPIRATION RATE: 18 BRPM | SYSTOLIC BLOOD PRESSURE: 106 MMHG | OXYGEN SATURATION: 100 %

## 2022-02-26 LAB
ANION GAP SERPL CALC-SCNC: 12 MMOL/L — SIGNIFICANT CHANGE UP (ref 7–14)
BUN SERPL-MCNC: 15 MG/DL — SIGNIFICANT CHANGE UP (ref 7–23)
CALCIUM SERPL-MCNC: 9.7 MG/DL — SIGNIFICANT CHANGE UP (ref 8.4–10.5)
CHLORIDE SERPL-SCNC: 106 MMOL/L — SIGNIFICANT CHANGE UP (ref 98–107)
CO2 SERPL-SCNC: 23 MMOL/L — SIGNIFICANT CHANGE UP (ref 22–31)
CREAT SERPL-MCNC: 0.74 MG/DL — SIGNIFICANT CHANGE UP (ref 0.5–1.3)
GLUCOSE SERPL-MCNC: 92 MG/DL — SIGNIFICANT CHANGE UP (ref 70–99)
HCT VFR BLD CALC: 39.6 % — SIGNIFICANT CHANGE UP (ref 34.5–45)
HGB BLD-MCNC: 13.2 G/DL — SIGNIFICANT CHANGE UP (ref 11.5–15.5)
MAGNESIUM SERPL-MCNC: 1.8 MG/DL — SIGNIFICANT CHANGE UP (ref 1.6–2.6)
MCHC RBC-ENTMCNC: 29.3 PG — SIGNIFICANT CHANGE UP (ref 27–34)
MCHC RBC-ENTMCNC: 33.3 GM/DL — SIGNIFICANT CHANGE UP (ref 32–36)
MCV RBC AUTO: 88 FL — SIGNIFICANT CHANGE UP (ref 80–100)
NRBC # BLD: 0 /100 WBCS — SIGNIFICANT CHANGE UP
NRBC # FLD: 0 K/UL — SIGNIFICANT CHANGE UP
PHOSPHATE SERPL-MCNC: 3.6 MG/DL — SIGNIFICANT CHANGE UP (ref 2.5–4.5)
PLATELET # BLD AUTO: 205 K/UL — SIGNIFICANT CHANGE UP (ref 150–400)
POTASSIUM SERPL-MCNC: 4 MMOL/L — SIGNIFICANT CHANGE UP (ref 3.5–5.3)
POTASSIUM SERPL-SCNC: 4 MMOL/L — SIGNIFICANT CHANGE UP (ref 3.5–5.3)
RBC # BLD: 4.5 M/UL — SIGNIFICANT CHANGE UP (ref 3.8–5.2)
RBC # FLD: 12.2 % — SIGNIFICANT CHANGE UP (ref 10.3–14.5)
SODIUM SERPL-SCNC: 141 MMOL/L — SIGNIFICANT CHANGE UP (ref 135–145)
WBC # BLD: 3.31 K/UL — LOW (ref 3.8–10.5)
WBC # FLD AUTO: 3.31 K/UL — LOW (ref 3.8–10.5)

## 2022-02-26 PROCEDURE — 99239 HOSP IP/OBS DSCHRG MGMT >30: CPT

## 2022-02-26 PROCEDURE — 74177 CT ABD & PELVIS W/CONTRAST: CPT | Mod: 26

## 2022-02-26 PROCEDURE — 71260 CT THORAX DX C+: CPT | Mod: 26

## 2022-02-26 NOTE — DISCHARGE NOTE NURSING/CASE MANAGEMENT/SOCIAL WORK - PATIENT PORTAL LINK FT
You can access the FollowMyHealth Patient Portal offered by [facility] by registering at the following website: [email or web address]. By joining [facility]’s FollowMyHealth portal, you will also be able to view your health information using other applications (apps) compatible with our system.
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
## 2022-02-26 NOTE — PROGRESS NOTE ADULT - SUBJECTIVE AND OBJECTIVE BOX
Zaid Ogden MD  Internal Medicine, PGY3  Universal pager: [phone] / [facility]: [phone]  Page Night Float after 19:00    Patient is a 51y old  Female who presents with a chief complaint of Headaches (26 Feb 2022 06:32)    OVERNIGHT EVENTS: NAEON    SUBJECTIVE:  - no complaint, feels better, ready to go home, no hallucinations, no SI/HI  - denies F/C, lightheadedness, HA, CP, SOB, abd pain, N/V/D/C, burning w/ urination, leg swelling    focused ROS as above    MEDICATIONS  (STANDING):  influenza   Vaccine 0.5 milliLiter(s) IntraMuscular once    MEDICATIONS  (PRN):  acetaminophen     Tablet .. 650 milliGRAM(s) Oral every 6 hours PRN Moderate Pain (4 - 6)      OBJECTIVE:  T(C): 36.8 (02-26-22 @ 06:56), Max: 36.9 (02-25-22 @ 21:06)  HR: 61 (02-26-22 @ 06:56) (61 - 91)  BP: 106/72 (02-26-22 @ 06:56) (100/60 - 109/60)  RR: 18 (02-26-22 @ 06:56) (17 - 18)  SpO2: 100% (02-26-22 @ 06:56) (100% - 100%)    PHYSICAL EXAM  GENERAL: resting comfortably, NAD   HEAD:  Atraumatic, normocephalic  EYES: EOMI, sclera clear  CHEST/LUNG: Clear to auscultation bilaterally; no wheeze  HEART: RRR; S1, S2; no M/R/G  ABDOMEN: soft, non-distended; non-tender; BS present  EXTREMITIES:  2+ Peripheral Pulses; no edema  NEURO: non-focal, AAOx4  PSYCH: appropriate affect, no SI/HI, no hallucination  SKIN: No rashes or lesions    LABS:  CAPILLARY BLOOD GLUCOSE        I&O's Summary    25 Feb 2022 07:01  -  26 Feb 2022 07:00  --------------------------------------------------------  IN: 200 mL / OUT: 750 mL / NET: -550 mL                            13.2   3.31  )-----------( 205      ( 26 Feb 2022 07:41 )             39.6     02-26    141  |  106  |  15  ----------------------------<  92  4.0   |  23  |  0.74    Ca    9.7      26 Feb 2022 07:41  Phos  3.6     02-26  Mg     1.80     02-26                Microbiology:    Culture - Acid Fast - CSF (collected 24 Feb 2022 22:37)  Source: .CSF CSF lumbar    Culture - CSF with Gram Stain (collected 24 Feb 2022 22:37)  Source: .CSF CSF lumbar  Gram Stain (24 Feb 2022 23:15):    No polymorphonuclear cells seen    No organisms seen    by cytocentrifuge  Preliminary Report (25 Feb 2022 16:33):    No growth        RADIOLOGY & ADDITIONAL TESTS:    Imaging Personally Reviewed:  Consultant(s) Notes Reviewed:    Care Discussed with Consultants/Other Providers: Zaid Ogden MD  Internal Medicine, PGY3  Universal pager: [phone] / [facility]: [phone]  Page Night Float after 19:00    Patient is a 51y old  Female who presents with a chief complaint of Headaches (26 Feb 2022 06:32)    OVERNIGHT EVENTS: NAEON    SUBJECTIVE:  - no complaint, feels better, ready to go home, no hallucinations, no SI/HI  - denies F/C, lightheadedness, HA, CP, SOB, abd pain, N/V/D/C, burning w/ urination, leg swelling    focused ROS as above    MEDICATIONS  (STANDING):  influenza   Vaccine 0.5 milliLiter(s) IntraMuscular once    MEDICATIONS  (PRN):  acetaminophen     Tablet .. 650 milliGRAM(s) Oral every 6 hours PRN Moderate Pain (4 - 6)      OBJECTIVE:  T(C): 36.8 (02-26-22 @ 06:56), Max: 36.9 (02-25-22 @ 21:06)  HR: 61 (02-26-22 @ 06:56) (61 - 91)  BP: 106/72 (02-26-22 @ 06:56) (100/60 - 109/60)  RR: 18 (02-26-22 @ 06:56) (17 - 18)  SpO2: 100% (02-26-22 @ 06:56) (100% - 100%)    PHYSICAL EXAM  GENERAL: resting comfortably, NAD   HEAD:  Atraumatic, normocephalic  NECK: non-rigid, no Brudzinski sign  EYES: EOMI, sclera clear  CHEST/LUNG: Clear to auscultation bilaterally; no wheeze  HEART: RRR; S1, S2; no M/R/G  ABDOMEN: soft, non-distended; non-tender; BS present  EXTREMITIES:  2+ Peripheral Pulses; no edema  NEURO: non-focal, AAOx4  PSYCH: appropriate affect, no SI/HI, no hallucination  SKIN: No rashes or lesions    LABS:  CAPILLARY BLOOD GLUCOSE        I&O's Summary    25 Feb 2022 07:01  -  26 Feb 2022 07:00  --------------------------------------------------------  IN: 200 mL / OUT: 750 mL / NET: -550 mL                            13.2   3.31  )-----------( 205      ( 26 Feb 2022 07:41 )             39.6     02-26    141  |  106  |  15  ----------------------------<  92  4.0   |  23  |  0.74    Ca    9.7      26 Feb 2022 07:41  Phos  3.6     02-26  Mg     1.80     02-2          Microbiology:    Culture - Acid Fast - CSF (collected 24 Feb 2022 22:37)  Source: .CSF CSF lumbar    Culture - CSF with Gram Stain (collected 24 Feb 2022 22:37)  Source: .CSF CSF lumbar  Gram Stain (24 Feb 2022 23:15):    No polymorphonuclear cells seen    No organisms seen    by cytocentrifuge  Preliminary Report (25 Feb 2022 16:33):    No growth        RADIOLOGY & ADDITIONAL TESTS:    Imaging Personally Reviewed: CXR  Consultant(s) Notes Reviewed: Neuro  Care Discussed with Consultants/Other Providers:

## 2022-02-26 NOTE — DISCHARGE NOTE NURSING/CASE MANAGEMENT/SOCIAL WORK - NSDCPEFALRISK_GEN_ALL_CORE
For information on Fall & Injury Prevention, visit: [email or web address] OR  [email or web address] OR  https://www.cdc.gov/steadi/patient.html

## 2022-02-26 NOTE — PROGRESS NOTE ADULT - PROBLEM SELECTOR PLAN 2
B/L Shoulder and Knee pain for months, states the mostly weakness with mild pain is worst when she wakes up and now with a new headache   + RADHA, neg CCPA, neg RF, creatine kinase wnl
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
## 2022-02-26 NOTE — PROGRESS NOTE ADULT - ATTENDING COMMENTS
Pt seen at bedside, feels well, agreeable for discharge home with outpt neurology followup.   ~35 minutes
Patient seen and examined, care d/w HS3.    51F working  no significant PMH presenting with  headaches for the past 2 months w/ associated loss of balance and no focal neurological deficits.  Patient with OP MRI, no notable findings per patient with PCP at start of HA, redemonstrated here.  Denies recent fevers or illness.  Born in [place] but no recent travel.  No recent COVID or vaccines.  No phono-photophobia, reports improvement in distance vision with need for reading glasses. Reports vague joint pains recently as well involving shoulder L>R and hands and knees.  Reports active. no family hx autoimmune disease.    - labs normal for leukopenia ( may be genetic), normal LFT/renal fxn, normal TSH, normal CRP/ESR as well as RF and CCP  - MRI neg for acute findings   - spot EEG neg  - s/p LP 2/24, prelim results neg,  f/u remaining serologies including autoimmune encephalitis  - psych and neuro now following  - pending r/o of organic causes for complaints     Dispo pending
Patient seen and examined, care d/w HS3.    51F working  no significant PMH presenting with  headaches for the past 2 months w/ associated loss of balance and no focal neurological deficits.  Patient with OP MRI, no notable findings per patient with PCP at start of HA.  Denies recent fevers or illness.  Born in [place] but no recent travel.  No recent COVID or vaccines.  No phono-photophobia, reports improvement in distance vision with need for reading glasses. Reports vague joint pains recently as well involving shoulder L>R and hands and knees.  Reports active. no family hx autoimmune disease.    - labs normal for leukopenia ( may be genetic), normal LFT/renal fxn, normal TSH, normal CRP/ESR as well as RF and CCP  - MRI neg for acute findings   - check EEG  - s/p LP today, f/u results, include autoimmune encephalitis  - need collateral re: veracity of claims re: "loud music" and lasers due to conflicts with neighbors     Dispo pending

## 2022-02-27 LAB
CULTURE RESULTS: NO GROWTH — SIGNIFICANT CHANGE UP
SPECIMEN SOURCE: SIGNIFICANT CHANGE UP

## 2022-02-28 LAB
DSDNA AB SER-ACNC: <12 IU/ML — SIGNIFICANT CHANGE UP
VDRL CSF-TITR: SIGNIFICANT CHANGE UP

## 2022-03-01 LAB
ARSENIC SERPL-MCNC: 2 UG/L — SIGNIFICANT CHANGE UP (ref 0–9)
CADMIUM SERPL-MCNC: <0.5 UG/L — SIGNIFICANT CHANGE UP (ref 0–1.2)
LEAD BLD-MCNC: <1 UG/DL — SIGNIFICANT CHANGE UP (ref 0–4)
MERCURY SERPL-MCNC: 2.3 UG/L — SIGNIFICANT CHANGE UP (ref 0–14.9)

## 2022-03-09 LAB
AMPA-R AB CBA, CSF: NEGATIVE — SIGNIFICANT CHANGE UP
AMPHIPHYSIN AB TITR CSF: NEGATIVE TITER — SIGNIFICANT CHANGE UP
CASPR2-IGG CBA, CSF: NEGATIVE — SIGNIFICANT CHANGE UP
CV2 IGG TITR CSF: NEGATIVE TITER — SIGNIFICANT CHANGE UP
DPPX ANTIBODY IFA, CSF: NEGATIVE — SIGNIFICANT CHANGE UP
GABA-B-R AB CBA, CSF: NEGATIVE — SIGNIFICANT CHANGE UP
GAD65 AB CSF-SCNC: 0 NMOL/L — SIGNIFICANT CHANGE UP
GFAP IFA, CSF: NEGATIVE — SIGNIFICANT CHANGE UP
GLIAL NUC TYPE 1 AB TITR CSF: NEGATIVE TITER — SIGNIFICANT CHANGE UP
HU1 AB TITR CSF IF: NEGATIVE TITER — SIGNIFICANT CHANGE UP
HU2 AB TITR CSF IF: NEGATIVE TITER — SIGNIFICANT CHANGE UP
HU3 AB TITR CSF: NEGATIVE TITER — SIGNIFICANT CHANGE UP
IGLON5 IFA, CSF: NEGATIVE — SIGNIFICANT CHANGE UP
IMMUNOLOGIST REVIEW: SIGNIFICANT CHANGE UP
LGI1-IGG CBA, CSF: NEGATIVE — SIGNIFICANT CHANGE UP
MGLUR1 AB IFA, CSF: NEGATIVE — SIGNIFICANT CHANGE UP
NIF IFA, CSF: NEGATIVE — SIGNIFICANT CHANGE UP
NMDA-R AB CBA, CSF: NEGATIVE — SIGNIFICANT CHANGE UP
PCA-TR AB TITR CSF: NEGATIVE TITER — SIGNIFICANT CHANGE UP
PURKINJE CELL CYTOPLASMIC AB TYPE 2: NEGATIVE TITER — SIGNIFICANT CHANGE UP
PURKINJE CELLS AB TITR CSF IF: NEGATIVE TITER — SIGNIFICANT CHANGE UP
REFLEX ADDED: SIGNIFICANT CHANGE UP

## 2022-04-16 LAB
CULTURE RESULTS: SIGNIFICANT CHANGE UP
SPECIMEN SOURCE: SIGNIFICANT CHANGE UP

## 2022-04-19 NOTE — ED ADULT TRIAGE NOTE - MODE OF ARRIVAL
----- Message from Phil Palm sent at 4/19/2022  2:29 PM EDT -----  Regarding: pt update  Pt states Lauryn has her lab results but someone has to call them at [phone] option 1 and request the results to be faxed to us.     Walk in

## 2023-11-15 ENCOUNTER — APPOINTMENT (OUTPATIENT)
Dept: OBGYN | Facility: CLINIC | Age: 53
End: 2023-11-15
Payer: COMMERCIAL

## 2023-11-15 VITALS
WEIGHT: 176 LBS | HEIGHT: 66 IN | HEART RATE: 67 BPM | BODY MASS INDEX: 28.28 KG/M2 | DIASTOLIC BLOOD PRESSURE: 76 MMHG | SYSTOLIC BLOOD PRESSURE: 113 MMHG

## 2023-11-15 DIAGNOSIS — Z01.419 ENCOUNTER FOR GYNECOLOGICAL EXAMINATION (GENERAL) (ROUTINE) W/OUT ABNORMAL FINDINGS: ICD-10-CM

## 2023-11-15 PROCEDURE — 99396 PREV VISIT EST AGE 40-64: CPT

## 2023-11-16 LAB — HPV HIGH+LOW RISK DNA PNL CVX: NOT DETECTED

## 2023-11-20 LAB — CYTOLOGY CVX/VAG DOC THIN PREP: ABNORMAL

## 2024-06-21 ENCOUNTER — EMERGENCY (EMERGENCY)
Facility: HOSPITAL | Age: 54
LOS: 1 days | Discharge: LEFT BEFORE DISCHARGE | End: 2024-06-21
Attending: STUDENT IN AN ORGANIZED HEALTH CARE EDUCATION/TRAINING PROGRAM | Admitting: STUDENT IN AN ORGANIZED HEALTH CARE EDUCATION/TRAINING PROGRAM
Payer: COMMERCIAL

## 2024-06-21 VITALS
HEART RATE: 91 BPM | DIASTOLIC BLOOD PRESSURE: 80 MMHG | TEMPERATURE: 98 F | WEIGHT: 169.98 LBS | OXYGEN SATURATION: 99 % | RESPIRATION RATE: 18 BRPM | SYSTOLIC BLOOD PRESSURE: 147 MMHG

## 2024-06-21 PROCEDURE — 99284 EMERGENCY DEPT VISIT MOD MDM: CPT

## 2024-06-21 RX ORDER — KETOROLAC TROMETHAMINE 30 MG/ML
15 SYRINGE (ML) INJECTION ONCE
Refills: 0 | Status: COMPLETED | OUTPATIENT
Start: 2024-06-21 | End: 2024-06-21

## 2024-06-21 RX ORDER — ACETAMINOPHEN 500 MG
975 TABLET ORAL ONCE
Refills: 0 | Status: ACTIVE | OUTPATIENT
Start: 2024-06-21

## 2024-06-21 RX ORDER — LIDOCAINE 4 G/100G
1 CREAM TOPICAL ONCE
Refills: 0 | Status: ACTIVE | OUTPATIENT
Start: 2024-06-21 | End: 2024-06-21

## 2024-06-21 NOTE — ED PROVIDER NOTE - NSFOLLOWUPINSTRUCTIONS_ED_ALL_ED_FT
YOU WERE SEEN IN THE ED FOR: ankle pain, hip pain, difficulty concentrating     WHILE YOU WERE HERE, YOU HAD: xrays, labs     FOR PAIN, YOU MAY TAKE TYLENOL (Acetaminophen) AND/OR IBUPROFEN (Advil or Motrin). FOLLOW THE INSTRUCTIONS ON THE LABEL/CONTAINER.    you were given a ankle brace for you ankle pain     PLEASE FOLLOW UP WITH YOUR PRIVATE PHYSICIAN WITHIN THE NEXT 48 HOURS. BRING COPIES OF YOUR RESULTS.    RETURN TO THE EMERGENCY DEPARTMENT IF YOU EXPERIENCE ANY NEW/CONCERNING/WORSENING SYMPTOMS SUCH AS BUT NOT LIMITED TO: vision changes, worsen headache of your life, stiff neck, shortness of breath, chest pain, rapid or irregular heartbeat, lightheadedness/dizziness, coughing up blood, or any bleeding in your vomit, stool, or urine.

## 2024-06-21 NOTE — ED PROVIDER NOTE - OBJECTIVE STATEMENT
54-year-old female "no past medical history", chart review significant for positive EBV testing, auditory hallucinations presents emergency department with left-sided ankle swelling for the last several weeks, right-sided hip pain for the last several weeks, intermediate left neck pain.  Patient states she has seen podiatrist for her issues last several weeks, negative x-rays.  Patient denies recent trauma.  also complains of mild difficulty concentrating. Patient denies fever, chills, nausea, vomiting.  Patient states she had flulike symptoms 3 weeks ago, went to primary care doctor and was given antibiotics, issues resolved.  Patient denies suicidal ideation, homicidal ideation, hallucinations, paranoia today

## 2024-06-21 NOTE — ED PROVIDER NOTE - PROGRESS NOTE DETAILS
Ivelisse Laird MD (PGY-2 EM): told by RN patient eloped bc she waiting 17 minutes before receiving blood work.

## 2024-06-21 NOTE — ED PROVIDER NOTE - PHYSICAL EXAMINATION
PHYSICAL EXAM:  CONSTITUTIONAL: Well appearing, awake, alert, oriented to person, place, time/situation and in no apparent distress.  HEAD: Atraumatic  EYES: Clear bilaterally, pupils equal, round and reactive to light.  ENMT: Airway patent, Nasal mucosa clear. Mouth with normal mucosa. Uvula is midline. neck ROM intact, non tender  CARDIAC: Normal rate, regular rhythm. +S1/S2. No murmurs, rubs or gallops.  RESPIRATORY: Breathing unlabored. Breath sounds clear and equal bilaterally.  ABDOMEN:  Soft, nontender, nondistended. No rebound tenderness or guarding.  NEUROLOGICAL: Alert and oriented, no focal deficits, no motor or sensory deficits. CN2-12 intact. Sensation intact x4 extremities.  SKIN: Skin warm and dry. No evidence of rashes or lesions.  MSK: peripheral pulse intact, no swelling to L ankle, no erythema, non tender to medial or lateral ankle or MTP of feet.. mild tenderness L hip.   Gait: wnl.

## 2024-06-21 NOTE — ED ADULT NURSE NOTE - CHIEF COMPLAINT QUOTE
Pt c/o B/L LE swelling and pain x4 months. States PCP sent her for xrays but "machine was not operating". [facility] has also been having "difficulty concentrating", has script for MRI but has not made appt. Denies chest pain, SOB, headache, dizziness. Denies hx. Well appearing

## 2024-06-21 NOTE — ED ADULT NURSE NOTE - OBJECTIVE STATEMENT
patient came to nurses station and states she is leaving and this is not the first time this happened to her before even nurse went in to her room patient came to the desk and said she is leaving and did not want to be started. patient was seen by the doctor and was waiting for nurse to go for labs/meds for 17 minutes in the room.

## 2024-06-21 NOTE — ED ADULT TRIAGE NOTE - CHIEF COMPLAINT QUOTE
Pt c/o B/L LE swelling and pain x4 months. States PCP sent her for xrays but "machine was not operating". [facility] has also been having "difficulty concentrating", has script for MRI but has not made appt. Denies chest pain, SOB, headache, dizziness. Denies hx. Well appearing

## 2024-06-21 NOTE — ED PROVIDER NOTE - ATTENDING CONTRIBUTION TO CARE
Ayla DOUGLAS: Resident saw and evaluated the patient however the patient eloped prior to my independent assessment. I did not see or examine the patient.

## 2024-06-21 NOTE — ED ADULT NURSE NOTE - NSFALLUNIVINTERV_ED_ALL_ED
Assistance OOB with selected safe patient handling equipment if applicable/Bed/Stretcher in lowest position, wheels locked, appropriate side rails in place/Call bell, personal items and telephone in reach/Instruct patient to call for assistance before getting out of bed/chair/stretcher/Non-slip footwear applied when patient is off stretcher/[place] to call system/Physically safe environment - no spills, clutter or unnecessary equipment/Purposeful proactive rounding/Room/bathroom lighting operational, light cord in reach

## 2024-06-21 NOTE — ED PROVIDER NOTE - CLINICAL SUMMARY MEDICAL DECISION MAKING FREE TEXT BOX
54-year-old female "no past medical history", chart review significant for positive EBV testing, auditory hallucinations presents emergency department with left-sided ankle swelling for the last several weeks, right-sided hip pain for the last several weeks, intermediate left neck pain. likely msk, will get screening xrays, will give symptomatic pain relief. will get screening labs to eval metabolic vs electrolyte abn. no concern for DVT, as ankle is non swollen, and non tender. will re-eval after med, labs, XR

## 2024-06-21 NOTE — ED ADULT NURSE NOTE - NSFALLOOBATTEMPT_ED_ALL_ED
Post-Care Instructions: I reviewed with the patient in detail post-care instructions. Patient is to wear sunprotection, and avoid picking at any of the treated lesions. Pt may apply Vaseline to crusted or scabbing areas. Render Post-Care Instructions In Note?: no No Detail Level: Simple Consent: The patient's consent was obtained including but not limited to risks of crusting, scabbing, blistering, scarring, darker or lighter pigmentary change, recurrence, incomplete removal and infection. Number Of Freeze-Thaw Cycles: 1 freeze-thaw cycle Duration Of Freeze Thaw-Cycle (Seconds): 2

## 2024-07-01 ENCOUNTER — APPOINTMENT (OUTPATIENT)
Dept: ULTRASOUND IMAGING | Facility: IMAGING CENTER | Age: 54
End: 2024-07-01

## 2024-07-27 NOTE — ED PROVIDER NOTE - ENMT, MLM
Hyperemesis/Other Airway patent, Nasal mucosa clear. Mouth with normal mucosa. Throat has no vesicles, no oropharyngeal exudates and uvula is midline.

## 2024-09-06 ENCOUNTER — APPOINTMENT (OUTPATIENT)
Dept: ULTRASOUND IMAGING | Facility: CLINIC | Age: 54
End: 2024-09-06
Payer: COMMERCIAL

## 2024-09-06 ENCOUNTER — OUTPATIENT (OUTPATIENT)
Dept: OUTPATIENT SERVICES | Facility: HOSPITAL | Age: 54
LOS: 1 days | End: 2024-09-06
Payer: COMMERCIAL

## 2024-09-06 DIAGNOSIS — Z00.8 ENCOUNTER FOR OTHER GENERAL EXAMINATION: ICD-10-CM

## 2024-09-06 PROCEDURE — 76700 US EXAM ABDOM COMPLETE: CPT

## 2024-09-06 PROCEDURE — 76700 US EXAM ABDOM COMPLETE: CPT | Mod: 26

## 2024-12-06 ENCOUNTER — APPOINTMENT (OUTPATIENT)
Dept: ULTRASOUND IMAGING | Facility: CLINIC | Age: 54
End: 2024-12-06
Payer: COMMERCIAL

## 2024-12-06 ENCOUNTER — OUTPATIENT (OUTPATIENT)
Dept: OUTPATIENT SERVICES | Facility: HOSPITAL | Age: 54
LOS: 1 days | End: 2024-12-06
Payer: COMMERCIAL

## 2024-12-06 DIAGNOSIS — Z00.8 ENCOUNTER FOR OTHER GENERAL EXAMINATION: ICD-10-CM

## 2024-12-06 PROCEDURE — 76700 US EXAM ABDOM COMPLETE: CPT

## 2024-12-06 PROCEDURE — 76700 US EXAM ABDOM COMPLETE: CPT | Mod: 26

## 2024-12-11 ENCOUNTER — APPOINTMENT (OUTPATIENT)
Dept: OBGYN | Facility: CLINIC | Age: 54
End: 2024-12-11

## 2024-12-11 VITALS
SYSTOLIC BLOOD PRESSURE: 123 MMHG | BODY MASS INDEX: 28.77 KG/M2 | WEIGHT: 179 LBS | HEIGHT: 66 IN | HEART RATE: 96 BPM | DIASTOLIC BLOOD PRESSURE: 81 MMHG

## 2024-12-11 DIAGNOSIS — Z01.419 ENCOUNTER FOR GYNECOLOGICAL EXAMINATION (GENERAL) (ROUTINE) W/OUT ABNORMAL FINDINGS: ICD-10-CM

## 2024-12-11 DIAGNOSIS — R39.15 URGENCY OF URINATION: ICD-10-CM

## 2024-12-11 PROCEDURE — 99459 PELVIC EXAMINATION: CPT

## 2024-12-11 PROCEDURE — 99396 PREV VISIT EST AGE 40-64: CPT

## 2024-12-12 LAB — HPV HIGH+LOW RISK DNA PNL CVX: NOT DETECTED

## 2024-12-13 LAB — BACTERIA UR CULT: NORMAL

## 2024-12-20 LAB — CYTOLOGY CVX/VAG DOC THIN PREP: ABNORMAL

## 2025-02-16 NOTE — BH CONSULTATION LIAISON ASSESSMENT NOTE - TIME CONSULT PERFORMED
MEDICAL ONCOLOGY PROGRESS NOTE    Pt Name: Fozia Briceno  MRN: [id number]  YOB: 1957  Date of evaluation: 2/17/2025    HISTORY OF PRESENT ILLNESS:    History of Present Illness  The patient presents for evaluation of small cell lung cancer cancer, limited stage.  She is status post 1 cycle of treatment with carboplatin etoposide.  On the third day after chemotherapy the patient developed a fever.  She was diagnosed with influenza A.  She was hospitalized at [facility], [place], operated by [facility] in the MICU and was intubated for acute hypoxemic respiratory failure.  She was extubated on Monday.  Eventually, she improved and was transferred to the floors.  She presents for cycle #2 today.  She is very weak.  She is in a wheelchair.  She is on O2 supplement.  She does not feel that she can get chemo today.        Diagnosis  Small cell lung cancer, right lung, Dec 2024  Stage  [id number], limited stage    Treatment Summary  12/13/24 - 12/19/24 Emergent palliative radiation therapy 1250cGy in 5 fractions to right lung  1/27/25 Initiated chemotherapy with Carboplatin D1, Etoposide D1-3 every 21 days x4 cycles  Anticipate radiation therapy to right lung with Dr Alli Perez/[facility]    Cancer History  Ms. 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 She has complaints today of fatigue. She states she has stabbing chest pain and mid back pain. She is currently being seen at pain management for pain medication. She presents today on 2L of oxygen dependently.   11/8/24 CT chest low dose ([facility]): 2 cm right upper lobe bilobed nodule with associated mediastinal and right hilar lymphadenopathy, suspicious for malignancy. Minimal adjacent   right upper lobe interlobular septal thickening may be related to venous or lymphatic congestion, however lymphangitic carcinomatosis is an additional consideration. Recommend further evaluation with  25-Feb-2022 10:00